# Patient Record
Sex: FEMALE | Race: WHITE | Employment: FULL TIME | URBAN - METROPOLITAN AREA
[De-identification: names, ages, dates, MRNs, and addresses within clinical notes are randomized per-mention and may not be internally consistent; named-entity substitution may affect disease eponyms.]

---

## 2017-05-03 ENCOUNTER — OFFICE VISIT (OUTPATIENT)
Dept: INTERNAL MEDICINE CLINIC | Facility: CLINIC | Age: 57
End: 2017-05-03

## 2017-05-03 VITALS
HEART RATE: 79 BPM | SYSTOLIC BLOOD PRESSURE: 134 MMHG | WEIGHT: 136.19 LBS | RESPIRATION RATE: 18 BRPM | DIASTOLIC BLOOD PRESSURE: 82 MMHG | HEIGHT: 67 IN | BODY MASS INDEX: 21.37 KG/M2 | OXYGEN SATURATION: 97 %

## 2017-05-03 DIAGNOSIS — E03.9 HYPOTHYROIDISM, UNSPECIFIED TYPE: ICD-10-CM

## 2017-05-03 DIAGNOSIS — Z12.11 SCREENING FOR COLON CANCER: ICD-10-CM

## 2017-05-03 DIAGNOSIS — J45.41 MODERATE PERSISTENT ASTHMA WITH ACUTE EXACERBATION: ICD-10-CM

## 2017-05-03 DIAGNOSIS — D72.829 LEUKOCYTOSIS, UNSPECIFIED TYPE: ICD-10-CM

## 2017-05-03 DIAGNOSIS — R42 DIZZINESS: Primary | ICD-10-CM

## 2017-05-03 DIAGNOSIS — E53.8 VITAMIN B12 DEFICIENCY: ICD-10-CM

## 2017-05-03 PROCEDURE — 87186 SC STD MICRODIL/AGAR DIL: CPT | Performed by: INTERNAL MEDICINE

## 2017-05-03 PROCEDURE — 87086 URINE CULTURE/COLONY COUNT: CPT | Performed by: INTERNAL MEDICINE

## 2017-05-03 PROCEDURE — 82607 VITAMIN B-12: CPT | Performed by: INTERNAL MEDICINE

## 2017-05-03 PROCEDURE — 99203 OFFICE O/P NEW LOW 30 MIN: CPT | Performed by: INTERNAL MEDICINE

## 2017-05-03 PROCEDURE — 82728 ASSAY OF FERRITIN: CPT | Performed by: INTERNAL MEDICINE

## 2017-05-03 PROCEDURE — 82746 ASSAY OF FOLIC ACID SERUM: CPT | Performed by: INTERNAL MEDICINE

## 2017-05-03 PROCEDURE — 82306 VITAMIN D 25 HYDROXY: CPT | Performed by: INTERNAL MEDICINE

## 2017-05-03 PROCEDURE — 87077 CULTURE AEROBIC IDENTIFY: CPT | Performed by: INTERNAL MEDICINE

## 2017-05-03 PROCEDURE — 85025 COMPLETE CBC W/AUTO DIFF WBC: CPT | Performed by: INTERNAL MEDICINE

## 2017-05-03 PROCEDURE — 86255 FLUORESCENT ANTIBODY SCREEN: CPT | Performed by: INTERNAL MEDICINE

## 2017-05-03 RX ORDER — CHLORAL HYDRATE 500 MG
1000 CAPSULE ORAL DAILY
COMMUNITY
End: 2018-09-25 | Stop reason: ALTCHOICE

## 2017-05-03 RX ORDER — LEVOTHYROXINE SODIUM 0.05 MG/1
50 TABLET ORAL
COMMUNITY
End: 2019-09-04 | Stop reason: ALTCHOICE

## 2017-05-03 RX ORDER — MONTELUKAST SODIUM 10 MG/1
10 TABLET ORAL NIGHTLY
Qty: 30 TABLET | Refills: 2 | Status: SHIPPED | OUTPATIENT
Start: 2017-05-03 | End: 2017-06-02

## 2017-05-03 RX ORDER — METHYLPREDNISOLONE 4 MG/1
TABLET ORAL
Qty: 1 KIT | Refills: 0 | Status: SHIPPED | OUTPATIENT
Start: 2017-05-03 | End: 2018-05-16 | Stop reason: ALTCHOICE

## 2017-05-03 RX ORDER — PREDNISONE 50 MG/1
TABLET ORAL
Refills: 3 | COMMUNITY
Start: 2017-03-02 | End: 2017-05-31 | Stop reason: ALTCHOICE

## 2017-05-03 RX ORDER — LEVOTHYROXINE SODIUM 0.2 MG/1
200 TABLET ORAL
COMMUNITY
End: 2018-05-23

## 2017-05-03 RX ORDER — LISINOPRIL 5 MG/1
5 TABLET ORAL
Refills: 3 | COMMUNITY
Start: 2017-04-22 | End: 2018-09-25

## 2017-05-03 RX ORDER — MECLIZINE HYDROCHLORIDE 25 MG/1
TABLET ORAL
Refills: 0 | COMMUNITY
Start: 2017-04-22 | End: 2018-09-25 | Stop reason: ALTCHOICE

## 2017-05-03 RX ORDER — AMOXICILLIN AND CLAVULANATE POTASSIUM 875; 125 MG/1; MG/1
1 TABLET, FILM COATED ORAL 2 TIMES DAILY
Qty: 20 TABLET | Refills: 0 | Status: SHIPPED | OUTPATIENT
Start: 2017-05-03 | End: 2017-05-13

## 2017-05-04 NOTE — PROGRESS NOTES
Jeevan Hamilton is a 60year old female.     Chief complaint: establish care, dizziness , vitamin b12 def     HPI:     60year old female with PMH as listed below here to establish care and follow up on chronic conditions   Dizziness  Started 2 weeks ago Oral Tablet Therapy Pack As directed. Disp: 1 kit Rfl: 0   Montelukast Sodium 10 MG Oral Tab Take 1 tablet (10 mg total) by mouth nightly.  Disp: 30 tablet Rfl: 2      Past Medical History   Diagnosis Date   • Hypothyroidism    • Asthma          Past Surgic Future  Number of Occurrences: 1  Standing Expiration Date: 2/6/6719  Folic Acid Serum [E]  Standing Status: Future  Number of Occurrences: 1  Standing Expiration Date: 5/3/2018  Vitamin B12 [E]  Standing Status: Future  Number of Occurrences: 1  Standing

## 2017-05-05 ENCOUNTER — TELEPHONE (OUTPATIENT)
Dept: INTERNAL MEDICINE CLINIC | Facility: CLINIC | Age: 57
End: 2017-05-05

## 2017-05-10 RX ORDER — ERGOCALCIFEROL 1.25 MG/1
50000 CAPSULE ORAL WEEKLY
Qty: 12 CAPSULE | Refills: 0 | Status: SHIPPED | OUTPATIENT
Start: 2017-05-10 | End: 2017-07-27

## 2017-05-31 ENCOUNTER — OFFICE VISIT (OUTPATIENT)
Dept: INTERNAL MEDICINE CLINIC | Facility: CLINIC | Age: 57
End: 2017-05-31

## 2017-05-31 VITALS
WEIGHT: 133 LBS | BODY MASS INDEX: 20.88 KG/M2 | HEART RATE: 103 BPM | DIASTOLIC BLOOD PRESSURE: 74 MMHG | OXYGEN SATURATION: 98 % | HEIGHT: 67 IN | SYSTOLIC BLOOD PRESSURE: 126 MMHG | RESPIRATION RATE: 17 BRPM

## 2017-05-31 DIAGNOSIS — E55.9 VITAMIN D DEFICIENCY: ICD-10-CM

## 2017-05-31 DIAGNOSIS — J45.20 MILD INTERMITTENT ASTHMA WITHOUT COMPLICATION: Primary | ICD-10-CM

## 2017-05-31 DIAGNOSIS — I10 ESSENTIAL HYPERTENSION: ICD-10-CM

## 2017-05-31 DIAGNOSIS — Z12.39 SCREENING FOR BREAST CANCER: ICD-10-CM

## 2017-05-31 PROCEDURE — 99214 OFFICE O/P EST MOD 30 MIN: CPT | Performed by: INTERNAL MEDICINE

## 2017-05-31 RX ORDER — BUDESONIDE AND FORMOTEROL FUMARATE DIHYDRATE 80; 4.5 UG/1; UG/1
2 AEROSOL RESPIRATORY (INHALATION) 2 TIMES DAILY
Qty: 1 INHALER | Refills: 3 | Status: SHIPPED | OUTPATIENT
Start: 2017-05-31 | End: 2017-10-30

## 2017-05-31 NOTE — PROGRESS NOTES
Marcin Arredondo is a 60year old female.     Chief complaint:  Follow up on asthma, HTN  HPI:     60year old female with PMH as listed below here for follow up on asthma and htn  Asthma feels much better   Best she felt x 3 years after starting the new med Packs/Day: 0.00  Years:           Quit date: 05/03/2014    Alcohol Use: Yes           0.0 oz/week       0 Standard drinks or equivalent per week       Family History   Problem Relation Age of Onset   • Cancer Father      lung CA   • Cancer Mo

## 2017-10-30 RX ORDER — BUDESONIDE AND FORMOTEROL FUMARATE DIHYDRATE 80; 4.5 UG/1; UG/1
2 AEROSOL RESPIRATORY (INHALATION) 2 TIMES DAILY
Qty: 1 INHALER | Refills: 3 | Status: SHIPPED | OUTPATIENT
Start: 2017-10-30 | End: 2017-12-29

## 2017-10-30 RX ORDER — BUDESONIDE AND FORMOTEROL FUMARATE DIHYDRATE 80; 4.5 UG/1; UG/1
2 AEROSOL RESPIRATORY (INHALATION) 2 TIMES DAILY
Qty: 10.2 INHALER | Refills: 3 | Status: SHIPPED | OUTPATIENT
Start: 2017-10-30 | End: 2018-08-31

## 2018-01-08 RX ORDER — MONTELUKAST SODIUM 10 MG/1
10 TABLET ORAL
Refills: 3 | COMMUNITY
Start: 2017-12-07 | End: 2018-01-08

## 2018-01-09 RX ORDER — MONTELUKAST SODIUM 10 MG/1
10 TABLET ORAL NIGHTLY
Qty: 90 TABLET | Refills: 0 | Status: SHIPPED | OUTPATIENT
Start: 2018-01-09 | End: 2018-03-19

## 2018-03-19 RX ORDER — MONTELUKAST SODIUM 10 MG/1
10 TABLET ORAL NIGHTLY
Qty: 90 TABLET | Refills: 0 | Status: SHIPPED | OUTPATIENT
Start: 2018-03-19 | End: 2018-07-20

## 2018-03-28 ENCOUNTER — OFFICE VISIT (OUTPATIENT)
Dept: INTERNAL MEDICINE CLINIC | Facility: CLINIC | Age: 58
End: 2018-03-28

## 2018-03-28 DIAGNOSIS — Z53.21 PATIENT LEFT WITHOUT BEING SEEN: Primary | ICD-10-CM

## 2018-05-16 ENCOUNTER — OFFICE VISIT (OUTPATIENT)
Dept: INTERNAL MEDICINE CLINIC | Facility: CLINIC | Age: 58
End: 2018-05-16

## 2018-05-16 VITALS
OXYGEN SATURATION: 100 % | DIASTOLIC BLOOD PRESSURE: 98 MMHG | HEIGHT: 67 IN | BODY MASS INDEX: 22.29 KG/M2 | SYSTOLIC BLOOD PRESSURE: 156 MMHG | HEART RATE: 65 BPM | WEIGHT: 142 LBS | TEMPERATURE: 98 F

## 2018-05-16 DIAGNOSIS — Z12.4 SCREENING FOR CERVICAL CANCER: ICD-10-CM

## 2018-05-16 DIAGNOSIS — J45.20 MILD INTERMITTENT ASTHMA WITHOUT COMPLICATION: ICD-10-CM

## 2018-05-16 DIAGNOSIS — Z00.00 ANNUAL PHYSICAL EXAM: Primary | ICD-10-CM

## 2018-05-16 DIAGNOSIS — Z12.11 SCREENING FOR COLON CANCER: ICD-10-CM

## 2018-05-16 DIAGNOSIS — I10 ESSENTIAL HYPERTENSION: ICD-10-CM

## 2018-05-16 PROCEDURE — 36415 COLL VENOUS BLD VENIPUNCTURE: CPT | Performed by: INTERNAL MEDICINE

## 2018-05-16 PROCEDURE — 84439 ASSAY OF FREE THYROXINE: CPT | Performed by: INTERNAL MEDICINE

## 2018-05-16 PROCEDURE — 80061 LIPID PANEL: CPT | Performed by: INTERNAL MEDICINE

## 2018-05-16 PROCEDURE — 84480 ASSAY TRIIODOTHYRONINE (T3): CPT | Performed by: INTERNAL MEDICINE

## 2018-05-16 PROCEDURE — 80050 GENERAL HEALTH PANEL: CPT | Performed by: INTERNAL MEDICINE

## 2018-05-16 PROCEDURE — 99396 PREV VISIT EST AGE 40-64: CPT | Performed by: INTERNAL MEDICINE

## 2018-05-16 RX ORDER — BUDESONIDE AND FORMOTEROL FUMARATE DIHYDRATE 80; 4.5 UG/1; UG/1
AEROSOL RESPIRATORY (INHALATION)
COMMUNITY
Start: 2018-03-17 | End: 2019-01-18

## 2018-05-16 RX ORDER — LISINOPRIL 5 MG/1
5 TABLET ORAL DAILY
Qty: 90 TABLET | Refills: 0 | Status: SHIPPED | OUTPATIENT
Start: 2018-05-16 | End: 2018-09-08

## 2018-05-16 RX ORDER — ALBUTEROL SULFATE 90 UG/1
2 AEROSOL, METERED RESPIRATORY (INHALATION)
COMMUNITY
Start: 2013-04-17 | End: 2018-05-23

## 2018-05-16 NOTE — PROGRESS NOTES
Kevin Leyva is a 59year old female.     Chief complaint: annual physical exam     HPI:     59year old female with PMH as listed below here for   Annual physical exam   Patient reports feeling well   No chest pain no sob no abdominal pain  No diarrhea Problem Relation Age of Onset   • Cancer Father      lung CA   • Cancer Mother    • Hypertension Sister      There is no problem list on file for this patient. REVIEW OF SYSTEMS:   A comprehensive 10 point review of systems was completed.   Pertinent INTERNAL  - Albuterol Sulfate  (90 Base) MCG/ACT Inhalation Aero Soln; Inhale 2 puffs into the lungs.  - SYMBICORT 80-4.5 MCG/ACT Inhalation Aerosol;   - CBC WITH DIFFERENTIAL WITH PLATELET; Future  - COMP METABOLIC PANEL (14);  Future  - LIPID PANEL

## 2018-05-21 ENCOUNTER — TELEPHONE (OUTPATIENT)
Dept: INTERNAL MEDICINE CLINIC | Facility: CLINIC | Age: 58
End: 2018-05-21

## 2018-05-21 DIAGNOSIS — R79.89 ABNORMAL THYROID BLOOD TEST: Primary | ICD-10-CM

## 2018-05-22 ENCOUNTER — TELEPHONE (OUTPATIENT)
Dept: INTERNAL MEDICINE CLINIC | Facility: CLINIC | Age: 58
End: 2018-05-22

## 2018-05-23 ENCOUNTER — TELEPHONE (OUTPATIENT)
Dept: INTERNAL MEDICINE CLINIC | Facility: CLINIC | Age: 58
End: 2018-05-23

## 2018-05-23 DIAGNOSIS — Z12.11 SCREENING FOR COLON CANCER: ICD-10-CM

## 2018-05-23 DIAGNOSIS — J45.41 MODERATE PERSISTENT ASTHMA WITH ACUTE EXACERBATION: ICD-10-CM

## 2018-05-23 DIAGNOSIS — D72.829 LEUKOCYTOSIS, UNSPECIFIED TYPE: ICD-10-CM

## 2018-05-23 DIAGNOSIS — R42 DIZZINESS: ICD-10-CM

## 2018-05-23 DIAGNOSIS — Z00.00 ANNUAL PHYSICAL EXAM: ICD-10-CM

## 2018-05-23 RX ORDER — LEVOTHYROXINE SODIUM 0.2 MG/1
200 TABLET ORAL
Qty: 90 TABLET | Refills: 1 | Status: SHIPPED | OUTPATIENT
Start: 2018-05-23 | End: 2019-06-12 | Stop reason: CLARIF

## 2018-05-23 RX ORDER — ALBUTEROL SULFATE 90 UG/1
2 AEROSOL, METERED RESPIRATORY (INHALATION) EVERY 6 HOURS PRN
Qty: 3 INHALER | Refills: 3 | Status: SHIPPED | OUTPATIENT
Start: 2018-05-23

## 2018-07-20 RX ORDER — MONTELUKAST SODIUM 10 MG/1
10 TABLET ORAL NIGHTLY
Qty: 90 TABLET | Refills: 0 | Status: SHIPPED
Start: 2018-07-20 | End: 2020-08-31

## 2018-07-20 NOTE — TELEPHONE ENCOUNTER
From: Gucci Orta  Sent: 7/20/2018 11:05 AM CDT  Subject: Medication Renewal Request    Gucci Orta would like a refill of the following medications:     Montelukast Sodium 10 MG Oral Tab Ananda Curran MD]    Preferred pharmacy: [id number]

## 2018-08-01 ENCOUNTER — TELEPHONE (OUTPATIENT)
Dept: INTERNAL MEDICINE CLINIC | Facility: CLINIC | Age: 58
End: 2018-08-01

## 2018-08-01 NOTE — TELEPHONE ENCOUNTER
would like to change medication symbicort, says she will have to pay $100 out of pocket. Needs to change to another medication that is covered by insurance.

## 2018-09-03 RX ORDER — BUDESONIDE AND FORMOTEROL FUMARATE DIHYDRATE 80; 4.5 UG/1; UG/1
2 AEROSOL RESPIRATORY (INHALATION) 2 TIMES DAILY
Qty: 10.2 INHALER | Refills: 3 | Status: SHIPPED | OUTPATIENT
Start: 2018-09-03 | End: 2018-09-25 | Stop reason: ALTCHOICE

## 2018-09-08 DIAGNOSIS — Z00.00 ANNUAL PHYSICAL EXAM: ICD-10-CM

## 2018-09-08 DIAGNOSIS — Z12.11 SCREENING FOR COLON CANCER: ICD-10-CM

## 2018-09-10 RX ORDER — LISINOPRIL 5 MG/1
TABLET ORAL
Qty: 90 TABLET | Refills: 0 | Status: SHIPPED | OUTPATIENT
Start: 2018-09-10 | End: 2018-12-07

## 2018-09-21 NOTE — TELEPHONE ENCOUNTER
Asthma & COPD Medication Protocol Failed9/21 1:54 AM   AAP/ACT given in last 12 months     Last OV 05/16/18.  No future appt

## 2018-09-22 RX ORDER — MONTELUKAST SODIUM 10 MG/1
TABLET ORAL
Qty: 90 TABLET | Refills: 0 | Status: SHIPPED | OUTPATIENT
Start: 2018-09-22 | End: 2018-09-25 | Stop reason: ALTCHOICE

## 2018-09-25 ENCOUNTER — OFFICE VISIT (OUTPATIENT)
Dept: INTERNAL MEDICINE CLINIC | Facility: CLINIC | Age: 58
End: 2018-09-25
Payer: COMMERCIAL

## 2018-09-25 VITALS
RESPIRATION RATE: 18 BRPM | DIASTOLIC BLOOD PRESSURE: 72 MMHG | TEMPERATURE: 99 F | WEIGHT: 139.38 LBS | SYSTOLIC BLOOD PRESSURE: 122 MMHG | BODY MASS INDEX: 21.88 KG/M2 | HEART RATE: 81 BPM | OXYGEN SATURATION: 98 % | HEIGHT: 67 IN

## 2018-09-25 DIAGNOSIS — J02.9 PHARYNGITIS, UNSPECIFIED ETIOLOGY: Primary | ICD-10-CM

## 2018-09-25 DIAGNOSIS — Z12.11 SCREENING FOR COLON CANCER: ICD-10-CM

## 2018-09-25 DIAGNOSIS — E03.9 HYPOTHYROIDISM, UNSPECIFIED TYPE: ICD-10-CM

## 2018-09-25 DIAGNOSIS — I10 ESSENTIAL HYPERTENSION: ICD-10-CM

## 2018-09-25 LAB
CONTROL LINE PRESENT WITH A CLEAR BACKGROUND (YES/NO): YES YES/NO
STREP GRP A CUL-SCR: NEGATIVE
TSH SERPL-ACNC: 4.31 UIU/ML (ref 0.45–5.33)

## 2018-09-25 PROCEDURE — 87081 CULTURE SCREEN ONLY: CPT | Performed by: INTERNAL MEDICINE

## 2018-09-25 PROCEDURE — 87880 STREP A ASSAY W/OPTIC: CPT | Performed by: INTERNAL MEDICINE

## 2018-09-25 PROCEDURE — 99214 OFFICE O/P EST MOD 30 MIN: CPT | Performed by: INTERNAL MEDICINE

## 2018-09-25 PROCEDURE — 84443 ASSAY THYROID STIM HORMONE: CPT | Performed by: INTERNAL MEDICINE

## 2018-09-25 RX ORDER — AMOXICILLIN AND CLAVULANATE POTASSIUM 875; 125 MG/1; MG/1
1 TABLET, FILM COATED ORAL 2 TIMES DAILY
Qty: 20 TABLET | Refills: 0 | Status: SHIPPED | OUTPATIENT
Start: 2018-09-25 | End: 2018-10-05

## 2018-09-25 NOTE — PROGRESS NOTES
Jeevan Hamilton is a 59year old female.     Chief complaint: sore throat    HPI:   59year old female with PMH as listed below here for   Sore throat  Started 1 week ago   Chills in the beginning   No fever  Little pain discomfort and sneezing   No chest p completed.   Pertinent positives and negatives noted in the the HPI            EXAM:   /72 (BP Location: Right arm, Patient Position: Sitting, Cuff Size: adult)   Pulse 81   Temp 98.9 °F (37.2 °C) (Oral)   Resp (!) 98   Ht 67\"   Wt 139 lb 6.4 oz   BM

## 2018-12-07 DIAGNOSIS — Z12.11 SCREENING FOR COLON CANCER: ICD-10-CM

## 2018-12-07 DIAGNOSIS — Z00.00 ANNUAL PHYSICAL EXAM: ICD-10-CM

## 2018-12-07 RX ORDER — LISINOPRIL 5 MG/1
TABLET ORAL
Qty: 90 TABLET | Refills: 0 | Status: SHIPPED | OUTPATIENT
Start: 2018-12-07 | End: 2019-03-08

## 2019-01-21 ENCOUNTER — TELEPHONE (OUTPATIENT)
Dept: INTERNAL MEDICINE CLINIC | Facility: CLINIC | Age: 59
End: 2019-01-21

## 2019-01-21 NOTE — TELEPHONE ENCOUNTER
My chart message sent. Called home & mobile numbers. LVM to inform Pt I faxed medication to [facility].

## 2019-02-05 DIAGNOSIS — E03.9 HYPOTHYROIDISM, UNSPECIFIED TYPE: Primary | ICD-10-CM

## 2019-02-06 ENCOUNTER — NURSE ONLY (OUTPATIENT)
Dept: INTERNAL MEDICINE CLINIC | Facility: CLINIC | Age: 59
End: 2019-02-06
Payer: COMMERCIAL

## 2019-02-06 DIAGNOSIS — E03.9 HYPOTHYROIDISM, UNSPECIFIED TYPE: ICD-10-CM

## 2019-02-06 LAB
T4 FREE SERPL-MCNC: 1.82 NG/DL (ref 0.58–1.64)
TSH SERPL-ACNC: 0.32 UIU/ML (ref 0.45–5.33)

## 2019-02-06 PROCEDURE — 84439 ASSAY OF FREE THYROXINE: CPT | Performed by: INTERNAL MEDICINE

## 2019-02-06 PROCEDURE — 84443 ASSAY THYROID STIM HORMONE: CPT | Performed by: INTERNAL MEDICINE

## 2019-02-06 PROCEDURE — 36415 COLL VENOUS BLD VENIPUNCTURE: CPT | Performed by: INTERNAL MEDICINE

## 2019-02-13 RX ORDER — LEVOTHYROXINE SODIUM 175 UG/1
175 TABLET ORAL
Qty: 90 TABLET | Refills: 1 | Status: SHIPPED | OUTPATIENT
Start: 2019-02-13 | End: 2019-09-04

## 2019-03-08 DIAGNOSIS — Z12.11 SCREENING FOR COLON CANCER: ICD-10-CM

## 2019-03-08 DIAGNOSIS — Z00.00 ANNUAL PHYSICAL EXAM: ICD-10-CM

## 2019-03-09 RX ORDER — LISINOPRIL 5 MG/1
TABLET ORAL
Qty: 90 TABLET | Refills: 0 | Status: SHIPPED | OUTPATIENT
Start: 2019-03-09 | End: 2019-06-20

## 2019-05-16 RX ORDER — MONTELUKAST SODIUM 10 MG/1
TABLET ORAL
Qty: 90 TABLET | Refills: 0 | OUTPATIENT
Start: 2019-05-16

## 2019-05-30 RX ORDER — MONTELUKAST SODIUM 10 MG/1
TABLET ORAL
Qty: 90 TABLET | Refills: 0 | Status: SHIPPED | OUTPATIENT
Start: 2019-05-30 | End: 2019-09-04

## 2019-05-30 NOTE — TELEPHONE ENCOUNTER
Asthma & COPD Medication Protocol Failed5/30 11:58 AM   Asthma Action Score greater than or equal to 20    AAP/ACT given in last 12 months    Appointment in past 6 or next 3 months      Last OV 9/25/18  Future appt 6/12/19

## 2019-06-06 DIAGNOSIS — Z12.11 SCREENING FOR COLON CANCER: ICD-10-CM

## 2019-06-06 DIAGNOSIS — Z00.00 ANNUAL PHYSICAL EXAM: ICD-10-CM

## 2019-06-07 RX ORDER — LISINOPRIL 5 MG/1
TABLET ORAL
Qty: 90 TABLET | Refills: 0 | OUTPATIENT
Start: 2019-06-07

## 2019-06-07 NOTE — TELEPHONE ENCOUNTER
Last office visit: 9/25/2018 acute-sore throat  Last refill: 3/9/2019 qty:90   Pt has upcoming appt 6/12/19

## 2019-06-12 ENCOUNTER — OFFICE VISIT (OUTPATIENT)
Dept: INTERNAL MEDICINE CLINIC | Facility: CLINIC | Age: 59
End: 2019-06-12
Payer: COMMERCIAL

## 2019-06-12 VITALS
SYSTOLIC BLOOD PRESSURE: 140 MMHG | WEIGHT: 140.19 LBS | OXYGEN SATURATION: 98 % | TEMPERATURE: 98 F | BODY MASS INDEX: 22 KG/M2 | HEART RATE: 80 BPM | HEIGHT: 67 IN | RESPIRATION RATE: 18 BRPM | DIASTOLIC BLOOD PRESSURE: 70 MMHG

## 2019-06-12 DIAGNOSIS — E16.2 HYPOGLYCEMIA: ICD-10-CM

## 2019-06-12 DIAGNOSIS — E03.9 HYPOTHYROIDISM, UNSPECIFIED TYPE: ICD-10-CM

## 2019-06-12 DIAGNOSIS — H93.8X1 PRESSURE SENSATION IN RIGHT EAR: ICD-10-CM

## 2019-06-12 DIAGNOSIS — I10 ESSENTIAL HYPERTENSION: Primary | ICD-10-CM

## 2019-06-12 DIAGNOSIS — Z12.11 SCREENING FOR COLON CANCER: ICD-10-CM

## 2019-06-12 DIAGNOSIS — J30.9 ALLERGIC SINUSITIS: ICD-10-CM

## 2019-06-12 DIAGNOSIS — Z12.39 SCREENING FOR BREAST CANCER: ICD-10-CM

## 2019-06-12 PROCEDURE — 84439 ASSAY OF FREE THYROXINE: CPT | Performed by: INTERNAL MEDICINE

## 2019-06-12 PROCEDURE — 80053 COMPREHEN METABOLIC PANEL: CPT | Performed by: INTERNAL MEDICINE

## 2019-06-12 PROCEDURE — 82306 VITAMIN D 25 HYDROXY: CPT | Performed by: INTERNAL MEDICINE

## 2019-06-12 PROCEDURE — 99214 OFFICE O/P EST MOD 30 MIN: CPT | Performed by: INTERNAL MEDICINE

## 2019-06-12 PROCEDURE — 82274 ASSAY TEST FOR BLOOD FECAL: CPT | Performed by: INTERNAL MEDICINE

## 2019-06-12 PROCEDURE — 84443 ASSAY THYROID STIM HORMONE: CPT | Performed by: INTERNAL MEDICINE

## 2019-06-12 RX ORDER — LEVOCETIRIZINE DIHYDROCHLORIDE 5 MG/1
5 TABLET, FILM COATED ORAL EVERY EVENING
Qty: 30 TABLET | Refills: 2 | Status: SHIPPED | OUTPATIENT
Start: 2019-06-12 | End: 2019-07-12

## 2019-06-12 RX ORDER — BUDESONIDE AND FORMOTEROL FUMARATE DIHYDRATE 80; 4.5 UG/1; UG/1
2 AEROSOL RESPIRATORY (INHALATION) 2 TIMES DAILY
Qty: 4 INHALER | Refills: 1 | Status: SHIPPED | OUTPATIENT
Start: 2019-06-12 | End: 2019-10-10

## 2019-06-12 RX ORDER — FLUTICASONE PROPIONATE 50 MCG
2 SPRAY, SUSPENSION (ML) NASAL DAILY
Qty: 1 BOTTLE | Refills: 3 | Status: SHIPPED | OUTPATIENT
Start: 2019-06-12 | End: 2020-06-06

## 2019-06-12 RX ORDER — LISINOPRIL 10 MG/1
10 TABLET ORAL DAILY
Qty: 90 TABLET | Refills: 1 | Status: SHIPPED | OUTPATIENT
Start: 2019-06-12 | End: 2019-09-04

## 2019-06-12 NOTE — PROGRESS NOTES
Génesis Branham is a 59year old female.     Chief complaint:here for follow up on chronic conditions     HPI:   Reports Congestion in right ear   little lightheaded today   Has been going on for Last couple of weeks   Seasonal allergy  No fever or chills Hypertension Sister      Patient Active Problem List:     Mild intermittent asthma without complication     Essential hypertension      REVIEW OF SYSTEMS:   A comprehensive 10 point review of systems was completed.   Pertinent positives and negatives noted Essential hypertension  Increase lisinopril to 10 mg daily   cmp     6.  Hypothyroidism, unspecified type  tsh   Continue levothyroxine   Recheck TSH   Please return to the clinic if you are having persistent or worsening symptoms   Kira Vitale MD,   Dip

## 2019-06-17 ENCOUNTER — TELEPHONE (OUTPATIENT)
Dept: INTERNAL MEDICINE CLINIC | Facility: CLINIC | Age: 59
End: 2019-06-17

## 2019-06-17 NOTE — TELEPHONE ENCOUNTER
Pt called and states that she saw Dr Hector Sommer last Wednesday and was wondering if someone could give her a call back concerning her prescriptions.  Please advise

## 2019-06-19 ENCOUNTER — TELEPHONE (OUTPATIENT)
Dept: INTERNAL MEDICINE CLINIC | Facility: CLINIC | Age: 59
End: 2019-06-19

## 2019-06-19 RX ORDER — LEVOTHYROXINE SODIUM 175 UG/1
175 TABLET ORAL
Qty: 105 TABLET | Refills: 1 | Status: SHIPPED | OUTPATIENT
Start: 2019-06-19 | End: 2019-09-17

## 2019-06-19 NOTE — TELEPHONE ENCOUNTER
Pt called and states that Dr Baltazar Robins called her but she wasn't sure why, possibly to discuss test results? Please advise.

## 2019-06-20 DIAGNOSIS — Z00.00 ANNUAL PHYSICAL EXAM: ICD-10-CM

## 2019-06-20 DIAGNOSIS — Z12.11 SCREENING FOR COLON CANCER: ICD-10-CM

## 2019-06-20 RX ORDER — LISINOPRIL 5 MG/1
TABLET ORAL
Qty: 90 TABLET | Refills: 0 | OUTPATIENT
Start: 2019-06-20

## 2019-06-20 RX ORDER — LISINOPRIL 5 MG/1
5 TABLET ORAL
Qty: 90 TABLET | Refills: 0 | Status: SHIPPED | OUTPATIENT
Start: 2019-06-20 | End: 2019-09-15

## 2019-09-04 ENCOUNTER — OFFICE VISIT (OUTPATIENT)
Dept: INTERNAL MEDICINE CLINIC | Facility: CLINIC | Age: 59
End: 2019-09-04
Payer: COMMERCIAL

## 2019-09-04 VITALS
DIASTOLIC BLOOD PRESSURE: 72 MMHG | HEART RATE: 85 BPM | WEIGHT: 144 LBS | OXYGEN SATURATION: 98 % | RESPIRATION RATE: 18 BRPM | TEMPERATURE: 98 F | SYSTOLIC BLOOD PRESSURE: 120 MMHG | HEIGHT: 67 IN | BODY MASS INDEX: 22.6 KG/M2

## 2019-09-04 DIAGNOSIS — Z00.00 ANNUAL PHYSICAL EXAM: Primary | ICD-10-CM

## 2019-09-04 DIAGNOSIS — J45.41 MODERATE PERSISTENT ASTHMA WITH ACUTE EXACERBATION: ICD-10-CM

## 2019-09-04 DIAGNOSIS — Z87.891 SMOKING HISTORY: ICD-10-CM

## 2019-09-04 DIAGNOSIS — E03.9 HYPOTHYROIDISM, UNSPECIFIED TYPE: ICD-10-CM

## 2019-09-04 DIAGNOSIS — R49.0 HOARSENESS OF VOICE: ICD-10-CM

## 2019-09-04 DIAGNOSIS — R42 DIZZINESS: ICD-10-CM

## 2019-09-04 DIAGNOSIS — I10 ESSENTIAL HYPERTENSION: ICD-10-CM

## 2019-09-04 PROCEDURE — 99396 PREV VISIT EST AGE 40-64: CPT | Performed by: INTERNAL MEDICINE

## 2019-09-04 RX ORDER — MECLIZINE HYDROCHLORIDE 25 MG/1
25 TABLET ORAL 3 TIMES DAILY PRN
Qty: 30 TABLET | Refills: 0 | Status: SHIPPED | OUTPATIENT
Start: 2019-09-04 | End: 2020-06-24 | Stop reason: ALTCHOICE

## 2019-09-04 NOTE — PROGRESS NOTES
Aldo Selby is a 62year old female.     Chief complaint:  Annual physical exam       HPI:   Here for annual physical exam   Has been having some voice changes   Clearing her throat   Postnasal drip   + coughing at times   Smoked for 40 years   Smoked 1 standard drinks      Comment: occ    Drug use: No       Family History   Problem Relation Age of Onset   • Cancer Father         lung CA   • Cancer Mother    • Hypertension Sister      Patient Active Problem List:     Mild intermittent asthma without compl level       5. Essential hypertension  Continue lisinopril   Will check cbc , cmp     6. Moderate persistent asthma with acute exacerbation  Continue symbicort   7.  Hoarseness of voice  Denied refluex   Discussed symbicort can cause hoarseness   Given her

## 2019-09-10 ENCOUNTER — TELEPHONE (OUTPATIENT)
Dept: INTERNAL MEDICINE CLINIC | Facility: CLINIC | Age: 59
End: 2019-09-10

## 2019-09-13 NOTE — TELEPHONE ENCOUNTER
Stress Echo does not require prior auth. Thus has been approved by insurance. MRI Brain and CT Lung still pending.

## 2019-09-15 DIAGNOSIS — Z00.00 ANNUAL PHYSICAL EXAM: ICD-10-CM

## 2019-09-15 DIAGNOSIS — Z12.11 SCREENING FOR COLON CANCER: ICD-10-CM

## 2019-09-16 ENCOUNTER — TELEPHONE (OUTPATIENT)
Dept: INTERNAL MEDICINE CLINIC | Facility: CLINIC | Age: 59
End: 2019-09-16

## 2019-09-16 RX ORDER — LISINOPRIL 5 MG/1
TABLET ORAL
Qty: 90 TABLET | Refills: 0 | Status: SHIPPED | OUTPATIENT
Start: 2019-09-16 | End: 2019-12-10

## 2019-09-16 NOTE — TELEPHONE ENCOUNTER
Patient is scheduled for blood work that she said Dr. Hector Sommer ordered. Coming for a nurse visit on Wed, 9/18 at 10:30 a.m. Verifying that the blood work is ordered.

## 2019-09-18 ENCOUNTER — NURSE ONLY (OUTPATIENT)
Dept: INTERNAL MEDICINE CLINIC | Facility: CLINIC | Age: 59
End: 2019-09-18
Payer: COMMERCIAL

## 2019-09-18 DIAGNOSIS — Z00.00 ANNUAL PHYSICAL EXAM: ICD-10-CM

## 2019-09-18 DIAGNOSIS — R49.0 HOARSENESS OF VOICE: ICD-10-CM

## 2019-09-18 DIAGNOSIS — E03.9 HYPOTHYROIDISM, UNSPECIFIED TYPE: ICD-10-CM

## 2019-09-18 DIAGNOSIS — Z87.891 SMOKING HISTORY: ICD-10-CM

## 2019-09-18 DIAGNOSIS — J45.41 MODERATE PERSISTENT ASTHMA WITH ACUTE EXACERBATION: ICD-10-CM

## 2019-09-18 DIAGNOSIS — I10 ESSENTIAL HYPERTENSION: ICD-10-CM

## 2019-09-18 DIAGNOSIS — R42 DIZZINESS: ICD-10-CM

## 2019-09-18 LAB
ALBUMIN SERPL-MCNC: 3.8 G/DL (ref 3.4–5)
ALBUMIN/GLOB SERPL: 1.1 {RATIO} (ref 1–2)
ALP LIVER SERPL-CCNC: 65 U/L (ref 50–130)
ALT SERPL-CCNC: 20 U/L (ref 13–56)
ANION GAP SERPL CALC-SCNC: 6 MMOL/L (ref 0–18)
AST SERPL-CCNC: 15 U/L (ref 15–37)
BASOPHILS # BLD AUTO: 0.05 X10(3) UL (ref 0–0.2)
BASOPHILS NFR BLD AUTO: 0.7 %
BILIRUB SERPL-MCNC: 0.5 MG/DL (ref 0.1–2)
BUN BLD-MCNC: 17 MG/DL (ref 7–18)
BUN/CREAT SERPL: 22.4 (ref 10–20)
CALCIUM BLD-MCNC: 9.4 MG/DL (ref 8.5–10.1)
CHLORIDE SERPL-SCNC: 106 MMOL/L (ref 98–112)
CHOLEST SMN-MCNC: 186 MG/DL (ref ?–200)
CO2 SERPL-SCNC: 26 MMOL/L (ref 21–32)
CREAT BLD-MCNC: 0.76 MG/DL (ref 0.55–1.02)
DEPRECATED RDW RBC AUTO: 41.5 FL (ref 35.1–46.3)
EOSINOPHIL # BLD AUTO: 0.36 X10(3) UL (ref 0–0.7)
EOSINOPHIL NFR BLD AUTO: 4.9 %
ERYTHROCYTE [DISTWIDTH] IN BLOOD BY AUTOMATED COUNT: 11.9 % (ref 11–15)
GLOBULIN PLAS-MCNC: 3.5 G/DL (ref 2.8–4.4)
GLUCOSE BLD-MCNC: 79 MG/DL (ref 70–99)
HCT VFR BLD AUTO: 40.1 % (ref 35–48)
HDLC SERPL-MCNC: 64 MG/DL (ref 40–59)
HGB BLD-MCNC: 12.6 G/DL (ref 12–16)
IMM GRANULOCYTES # BLD AUTO: 0.01 X10(3) UL (ref 0–1)
IMM GRANULOCYTES NFR BLD: 0.1 %
LDLC SERPL CALC-MCNC: 106 MG/DL (ref ?–100)
LYMPHOCYTES # BLD AUTO: 2.11 X10(3) UL (ref 1–4)
LYMPHOCYTES NFR BLD AUTO: 28.6 %
M PROTEIN MFR SERPL ELPH: 7.3 G/DL (ref 6.4–8.2)
MCH RBC QN AUTO: 30 PG (ref 26–34)
MCHC RBC AUTO-ENTMCNC: 31.4 G/DL (ref 31–37)
MCV RBC AUTO: 95.5 FL (ref 80–100)
MONOCYTES # BLD AUTO: 0.64 X10(3) UL (ref 0.1–1)
MONOCYTES NFR BLD AUTO: 8.7 %
NEUTROPHILS # BLD AUTO: 4.22 X10 (3) UL (ref 1.5–7.7)
NEUTROPHILS # BLD AUTO: 4.22 X10(3) UL (ref 1.5–7.7)
NEUTROPHILS NFR BLD AUTO: 57 %
NONHDLC SERPL-MCNC: 122 MG/DL (ref ?–130)
OSMOLALITY SERPL CALC.SUM OF ELEC: 286 MOSM/KG (ref 275–295)
PATIENT FASTING: YES
PATIENT FASTING: YES
PLATELET # BLD AUTO: 360 10(3)UL (ref 150–450)
POTASSIUM SERPL-SCNC: 4.3 MMOL/L (ref 3.5–5.1)
RBC # BLD AUTO: 4.2 X10(6)UL (ref 3.8–5.3)
SODIUM SERPL-SCNC: 138 MMOL/L (ref 136–145)
T4 FREE SERPL-MCNC: 1.2 NG/DL (ref 0.8–1.7)
TRIGL SERPL-MCNC: 78 MG/DL (ref 30–149)
TSI SER-ACNC: 3.88 MIU/ML (ref 0.36–3.74)
VIT B12 SERPL-MCNC: 283 PG/ML (ref 193–986)
VLDLC SERPL CALC-MCNC: 16 MG/DL (ref 0–30)
WBC # BLD AUTO: 7.4 X10(3) UL (ref 4–11)

## 2019-09-18 PROCEDURE — 80050 GENERAL HEALTH PANEL: CPT | Performed by: INTERNAL MEDICINE

## 2019-09-18 PROCEDURE — 80061 LIPID PANEL: CPT | Performed by: INTERNAL MEDICINE

## 2019-09-18 PROCEDURE — 36415 COLL VENOUS BLD VENIPUNCTURE: CPT | Performed by: INTERNAL MEDICINE

## 2019-09-18 PROCEDURE — 84439 ASSAY OF FREE THYROXINE: CPT | Performed by: INTERNAL MEDICINE

## 2019-09-18 PROCEDURE — 82607 VITAMIN B-12: CPT | Performed by: INTERNAL MEDICINE

## 2019-09-23 RX ORDER — LEVOTHYROXINE SODIUM 175 UG/1
175 TABLET ORAL
Qty: 105 TABLET | Refills: 0 | Status: SHIPPED | OUTPATIENT
Start: 2019-09-23 | End: 2020-06-24 | Stop reason: ALTCHOICE

## 2019-09-24 ENCOUNTER — TELEPHONE (OUTPATIENT)
Dept: INTERNAL MEDICINE CLINIC | Facility: CLINIC | Age: 59
End: 2019-09-24

## 2019-10-03 ENCOUNTER — TELEPHONE (OUTPATIENT)
Dept: INTERNAL MEDICINE CLINIC | Facility: CLINIC | Age: 59
End: 2019-10-03

## 2019-10-03 NOTE — TELEPHONE ENCOUNTER
Patient called the office regarding getting her current test results on an MRI and Echo Cardiogram that was completed at [facility].  Please call back at [phone]

## 2019-10-07 ENCOUNTER — TELEPHONE (OUTPATIENT)
Dept: INTERNAL MEDICINE CLINIC | Facility: CLINIC | Age: 59
End: 2019-10-07

## 2019-10-07 NOTE — TELEPHONE ENCOUNTER
Discussed with the patient   -Echo is normal except for mild mitral regurgitation : advised controlling risk factors BP, avoid smoking   And repeat echo in 1 year   -MRI brain no acute infarct , chronic mild microvascular changes   Avoid smoking , control

## 2019-11-14 ENCOUNTER — TELEPHONE (OUTPATIENT)
Dept: INTERNAL MEDICINE CLINIC | Facility: CLINIC | Age: 59
End: 2019-11-14

## 2019-11-14 NOTE — TELEPHONE ENCOUNTER
LVM:  Received approval from insurance for CHEST CT. Needs to be conducted no later than Dec 11, 2019.
22

## 2019-12-10 DIAGNOSIS — Z00.00 ANNUAL PHYSICAL EXAM: ICD-10-CM

## 2019-12-10 DIAGNOSIS — Z12.11 SCREENING FOR COLON CANCER: ICD-10-CM

## 2019-12-10 RX ORDER — LISINOPRIL 5 MG/1
TABLET ORAL
Qty: 90 TABLET | Refills: 0 | Status: SHIPPED | OUTPATIENT
Start: 2019-12-10 | End: 2021-01-05

## 2019-12-10 RX ORDER — MONTELUKAST SODIUM 10 MG/1
TABLET ORAL
Qty: 90 TABLET | Refills: 0 | Status: SHIPPED | OUTPATIENT
Start: 2019-12-10 | End: 2020-05-29

## 2019-12-10 NOTE — TELEPHONE ENCOUNTER
Asthma & COPD Medication Protocol [place]15/10 12:34 PM   Asthma Action Score greater than or equal to 20    AAP/ACT given in last 12 months    Appointment in past 6 or next 3 months      Last OV 9/4/19  No Future appt

## 2019-12-14 ENCOUNTER — TELEPHONE (OUTPATIENT)
Dept: INTERNAL MEDICINE CLINIC | Facility: CLINIC | Age: 59
End: 2019-12-14

## 2019-12-14 DIAGNOSIS — I25.84 CORONARY ARTERY CALCIFICATION: Primary | ICD-10-CM

## 2019-12-14 DIAGNOSIS — R91.8 MULTIPLE LUNG NODULES ON CT: ICD-10-CM

## 2019-12-14 DIAGNOSIS — K44.9 HIATAL HERNIA: ICD-10-CM

## 2019-12-14 DIAGNOSIS — I25.10 CORONARY ARTERY CALCIFICATION: Primary | ICD-10-CM

## 2019-12-14 NOTE — TELEPHONE ENCOUNTER
Discussed ct lung screen with the patient   + emphysema   Lung nodules 3-4 mm   Ground glass opacity   djd   Moderate HH and coronary artery calcifications     Advised to start baby asa CT calcium score   Repeat CT lung in 6-12 months   Gastro referral sin

## 2019-12-16 ENCOUNTER — TELEPHONE (OUTPATIENT)
Dept: INTERNAL MEDICINE CLINIC | Facility: CLINIC | Age: 59
End: 2019-12-16

## 2019-12-17 ENCOUNTER — PATIENT MESSAGE (OUTPATIENT)
Dept: INTERNAL MEDICINE CLINIC | Facility: CLINIC | Age: 59
End: 2019-12-17

## 2019-12-17 ENCOUNTER — TELEPHONE (OUTPATIENT)
Dept: INTERNAL MEDICINE CLINIC | Facility: CLINIC | Age: 59
End: 2019-12-17

## 2019-12-17 NOTE — TELEPHONE ENCOUNTER
Patient's  called needing a 5 digit number, for his insurance company, so the test can be pre-authorized for the CT Calcium Scoring Test.

## 2019-12-19 ENCOUNTER — TELEPHONE (OUTPATIENT)
Dept: INTERNAL MEDICINE CLINIC | Facility: CLINIC | Age: 59
End: 2019-12-19

## 2019-12-26 ENCOUNTER — TELEPHONE (OUTPATIENT)
Dept: INTERNAL MEDICINE CLINIC | Facility: CLINIC | Age: 59
End: 2019-12-26

## 2019-12-26 NOTE — TELEPHONE ENCOUNTER
Lisa from [employer] left message on nurse line stating CT Calcium Score testing is not clinically indicated so is being denied at this time.

## 2019-12-26 NOTE — TELEPHONE ENCOUNTER
Lisa from [employer] left message on nurse line stating CT Calcium Score testing is not clinically indicated so is being denied at this time.

## 2020-01-08 ENCOUNTER — NURSE ONLY (OUTPATIENT)
Dept: INTERNAL MEDICINE CLINIC | Facility: CLINIC | Age: 60
End: 2020-01-08
Payer: COMMERCIAL

## 2020-01-08 DIAGNOSIS — E03.9 HYPOTHYROIDISM, UNSPECIFIED TYPE: Primary | ICD-10-CM

## 2020-01-08 LAB
T4 FREE SERPL-MCNC: 1 NG/DL (ref 0.8–1.7)
TSI SER-ACNC: 23 MIU/ML (ref 0.36–3.74)

## 2020-01-08 PROCEDURE — 84439 ASSAY OF FREE THYROXINE: CPT | Performed by: INTERNAL MEDICINE

## 2020-01-08 PROCEDURE — 36415 COLL VENOUS BLD VENIPUNCTURE: CPT | Performed by: INTERNAL MEDICINE

## 2020-01-08 PROCEDURE — 84443 ASSAY THYROID STIM HORMONE: CPT | Performed by: INTERNAL MEDICINE

## 2020-01-11 ENCOUNTER — TELEPHONE (OUTPATIENT)
Dept: INTERNAL MEDICINE CLINIC | Facility: CLINIC | Age: 60
End: 2020-01-11

## 2020-01-11 NOTE — TELEPHONE ENCOUNTER
Medical records release form was received from  [facility] Ph. [phone]  Ph. [phone] Consumer Service Division and sent to [address] [place] [address] STAT

## 2020-05-26 DIAGNOSIS — Z00.00 ANNUAL PHYSICAL EXAM: ICD-10-CM

## 2020-05-26 DIAGNOSIS — Z12.11 SCREENING FOR COLON CANCER: ICD-10-CM

## 2020-05-26 RX ORDER — LISINOPRIL 5 MG/1
TABLET ORAL
Qty: 90 TABLET | Refills: 0 | OUTPATIENT
Start: 2020-05-26

## 2020-05-26 RX ORDER — MONTELUKAST SODIUM 10 MG/1
TABLET ORAL
Qty: 90 TABLET | Refills: 0 | OUTPATIENT
Start: 2020-05-26

## 2020-05-28 ENCOUNTER — PATIENT MESSAGE (OUTPATIENT)
Dept: INTERNAL MEDICINE CLINIC | Facility: CLINIC | Age: 60
End: 2020-05-28

## 2020-05-29 RX ORDER — MONTELUKAST SODIUM 10 MG/1
10 TABLET ORAL EVERY EVENING
Qty: 90 TABLET | Refills: 0 | Status: SHIPPED | OUTPATIENT
Start: 2020-05-29 | End: 2020-06-24

## 2020-05-29 NOTE — TELEPHONE ENCOUNTER
Amada Loredo, [place] 5/29/2020 7:44 AM CDT      ----- Message -----  From: Josué Lim  Sent: 5/28/2020 7:04 PM CDT  To: Meghan Bustamante Clinical Staff  Subject: Prescription Question     Hi . Hope all is well.   [facility] pharmacy reached out to me to tell m

## 2020-06-11 ENCOUNTER — TELEPHONE (OUTPATIENT)
Dept: INTERNAL MEDICINE CLINIC | Facility: CLINIC | Age: 60
End: 2020-06-11

## 2020-06-11 NOTE — TELEPHONE ENCOUNTER
Received fax from Pharmacy     Refill request for Synthroid (levothyroxine sodium) 200 MCG  Quantity: 180.00 Tablets  Refills: 1     CALL IN OR FAX IN MAILBOX. Unable to send e-scribe.

## 2020-06-15 RX ORDER — LEVOTHYROXINE SODIUM 0.2 MG/1
200 TABLET ORAL
Qty: 180 TABLET | Refills: 1 | Status: SHIPPED | OUTPATIENT
Start: 2020-06-15 | End: 2021-03-18

## 2020-06-24 ENCOUNTER — OFFICE VISIT (OUTPATIENT)
Dept: INTERNAL MEDICINE CLINIC | Facility: CLINIC | Age: 60
End: 2020-06-24
Payer: COMMERCIAL

## 2020-06-24 VITALS
BODY MASS INDEX: 22.13 KG/M2 | HEIGHT: 67 IN | RESPIRATION RATE: 18 BRPM | SYSTOLIC BLOOD PRESSURE: 124 MMHG | HEART RATE: 65 BPM | DIASTOLIC BLOOD PRESSURE: 80 MMHG | WEIGHT: 141 LBS | OXYGEN SATURATION: 99 %

## 2020-06-24 DIAGNOSIS — E03.9 HYPOTHYROIDISM, UNSPECIFIED TYPE: Primary | ICD-10-CM

## 2020-06-24 DIAGNOSIS — Z12.11 SCREENING FOR COLON CANCER: ICD-10-CM

## 2020-06-24 DIAGNOSIS — Z12.39 SCREENING FOR BREAST CANCER: ICD-10-CM

## 2020-06-24 DIAGNOSIS — I10 ESSENTIAL HYPERTENSION: ICD-10-CM

## 2020-06-24 DIAGNOSIS — R91.1 LUNG NODULE: ICD-10-CM

## 2020-06-24 DIAGNOSIS — J45.20 MILD INTERMITTENT ASTHMA WITHOUT COMPLICATION: ICD-10-CM

## 2020-06-24 PROCEDURE — 84439 ASSAY OF FREE THYROXINE: CPT | Performed by: INTERNAL MEDICINE

## 2020-06-24 PROCEDURE — 84443 ASSAY THYROID STIM HORMONE: CPT | Performed by: INTERNAL MEDICINE

## 2020-06-24 PROCEDURE — 80053 COMPREHEN METABOLIC PANEL: CPT | Performed by: INTERNAL MEDICINE

## 2020-06-24 PROCEDURE — 36415 COLL VENOUS BLD VENIPUNCTURE: CPT | Performed by: INTERNAL MEDICINE

## 2020-06-24 PROCEDURE — 99214 OFFICE O/P EST MOD 30 MIN: CPT | Performed by: INTERNAL MEDICINE

## 2020-06-24 RX ORDER — BUDESONIDE AND FORMOTEROL FUMARATE DIHYDRATE 80; 4.5 UG/1; UG/1
2 AEROSOL RESPIRATORY (INHALATION) 2 TIMES DAILY
Qty: 3 INHALER | Refills: 3 | Status: SHIPPED | OUTPATIENT
Start: 2020-06-24 | End: 2020-07-15

## 2020-06-24 NOTE — PROGRESS NOTES
Génesis Branham is a 62year old female.     Chief complaint: follow up on chronic conditions     HPI:   62year old female with PMH as listed below  Here for   Follow up on chronic conditions     Asthma   Has some sinus congestion   Got worse in the last w Hypertension Sister      Patient Active Problem List:     Mild intermittent asthma without complication     Essential hypertension      REVIEW OF SYSTEMS:   A comprehensive 10 point review of systems was completed.   Pertinent positives and negatives noted return to the clinic if you are having persistent or worsening symptoms   Fawad Quintero MD,   Diplomate of the American Board of Internal Medicine  Diplomate of the American Board of Obesity Medicine

## 2020-06-24 NOTE — PROGRESS NOTES
Pt presented to clinic today for blood draw. Per physician able to draw orders. Orders  documented within chart. Pt tolerated lab draw well.  verified.   Orders drawn include: tsh, cmp  Site of draw: rt haydee Tomlinson CMA

## 2020-06-29 ENCOUNTER — PATIENT MESSAGE (OUTPATIENT)
Dept: INTERNAL MEDICINE CLINIC | Facility: CLINIC | Age: 60
End: 2020-06-29

## 2020-06-29 DIAGNOSIS — E03.9 HYPOTHYROIDISM, UNSPECIFIED TYPE: Primary | ICD-10-CM

## 2020-06-29 NOTE — TELEPHONE ENCOUNTER
From: Alva Olivas  To:  Susan Jameson MD  Sent: 6/29/2020 1:06 PM CDT  Subject: Prescription Question    Afternoon Dr Jayden Tran  Please send my script for Synthroid to the [facility].  I don't believe I can take the generic,I tried it once years ago and had le

## 2020-06-30 RX ORDER — LEVOTHYROXINE SODIUM 0.03 MG/1
25 TABLET ORAL
Qty: 90 TABLET | Refills: 0 | Status: SHIPPED | OUTPATIENT
Start: 2020-06-30 | End: 2020-09-23

## 2020-06-30 RX ORDER — LEVOTHYROXINE SODIUM 0.2 MG/1
200 TABLET ORAL
Qty: 90 TABLET | Refills: 0 | Status: SHIPPED | OUTPATIENT
Start: 2020-06-30 | End: 2020-09-23

## 2020-07-15 DIAGNOSIS — R91.1 LUNG NODULE: ICD-10-CM

## 2020-07-15 DIAGNOSIS — Z12.11 SCREENING FOR COLON CANCER: ICD-10-CM

## 2020-07-15 DIAGNOSIS — Z12.39 SCREENING FOR BREAST CANCER: ICD-10-CM

## 2020-07-15 DIAGNOSIS — J45.20 MILD INTERMITTENT ASTHMA WITHOUT COMPLICATION: ICD-10-CM

## 2020-07-15 DIAGNOSIS — I10 ESSENTIAL HYPERTENSION: ICD-10-CM

## 2020-07-15 DIAGNOSIS — E03.9 HYPOTHYROIDISM, UNSPECIFIED TYPE: ICD-10-CM

## 2020-07-16 RX ORDER — BUDESONIDE AND FORMOTEROL FUMARATE DIHYDRATE 80; 4.5 UG/1; UG/1
2 AEROSOL RESPIRATORY (INHALATION) 2 TIMES DAILY
Qty: 3 INHALER | Refills: 0 | Status: SHIPPED | OUTPATIENT
Start: 2020-07-16 | End: 2020-07-22

## 2020-08-31 RX ORDER — MONTELUKAST SODIUM 10 MG/1
TABLET ORAL
Qty: 90 TABLET | Refills: 0 | Status: SHIPPED | OUTPATIENT
Start: 2020-08-31 | End: 2020-12-22

## 2020-09-23 RX ORDER — LEVOTHYROXINE SODIUM 0.03 MG/1
25 TABLET ORAL
Qty: 90 TABLET | Refills: 0 | Status: SHIPPED | OUTPATIENT
Start: 2020-09-23 | End: 2020-12-21

## 2020-09-23 RX ORDER — LEVOTHYROXINE SODIUM 0.2 MG/1
200 TABLET ORAL
Qty: 90 TABLET | Refills: 0 | Status: SHIPPED | OUTPATIENT
Start: 2020-09-23 | End: 2020-12-21

## 2020-09-29 ENCOUNTER — OFFICE VISIT (OUTPATIENT)
Dept: INTERNAL MEDICINE CLINIC | Facility: CLINIC | Age: 60
End: 2020-09-29
Payer: COMMERCIAL

## 2020-09-29 ENCOUNTER — TELEPHONE (OUTPATIENT)
Dept: INTERNAL MEDICINE CLINIC | Facility: CLINIC | Age: 60
End: 2020-09-29

## 2020-09-29 VITALS
RESPIRATION RATE: 16 BRPM | BODY MASS INDEX: 21.97 KG/M2 | OXYGEN SATURATION: 97 % | SYSTOLIC BLOOD PRESSURE: 116 MMHG | HEART RATE: 85 BPM | DIASTOLIC BLOOD PRESSURE: 78 MMHG | WEIGHT: 140 LBS | HEIGHT: 67 IN

## 2020-09-29 DIAGNOSIS — E03.9 HYPOTHYROIDISM, UNSPECIFIED TYPE: ICD-10-CM

## 2020-09-29 DIAGNOSIS — M54.2 NECK PAIN: ICD-10-CM
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
## 2020-09-29 LAB — TSI SER-ACNC: 0.42 MIU/ML (ref 0.36–3.74)

## 2020-09-29 PROCEDURE — 99214 OFFICE O/P EST MOD 30 MIN: CPT | Performed by: INTERNAL MEDICINE

## 2020-09-29 PROCEDURE — 3074F SYST BP LT 130 MM HG: CPT | Performed by: INTERNAL MEDICINE

## 2020-09-29 PROCEDURE — 84443 ASSAY THYROID STIM HORMONE: CPT | Performed by: INTERNAL MEDICINE

## 2020-09-29 PROCEDURE — 3008F BODY MASS INDEX DOCD: CPT | Performed by: INTERNAL MEDICINE

## 2020-09-29 PROCEDURE — 3078F DIAST BP <80 MM HG: CPT | Performed by: INTERNAL MEDICINE

## 2020-09-29 RX ORDER — NAPROXEN 500 MG/1
TABLET ORAL
COMMUNITY
Start: 2020-09-27

## 2020-09-29 RX ORDER — CYCLOBENZAPRINE HCL 10 MG
10 TABLET ORAL
COMMUNITY
Start: 2020-09-27
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
## 2020-09-29 NOTE — TELEPHONE ENCOUNTER
Patient going to [facility] for the Cervical CT. They are asking if it is with Or without contrast.  Also order should read facility as [facility]. I believe she'll need to access it from [employer] once submitted.

## 2020-10-06 ENCOUNTER — TELEPHONE (OUTPATIENT)
Dept: INTERNAL MEDICINE CLINIC | Facility: CLINIC | Age: 60
End: 2020-10-06
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
## 2020-10-06 DIAGNOSIS — M54.10 RADICULOPATHY, UNSPECIFIED SPINAL REGION: Primary | ICD-10-CM

## 2020-10-06 NOTE — TELEPHONE ENCOUNTER
Discussed with the patient   CT cervical spine   Djd, spinal stenosis and bone spurs mild to moderate     Advised PT and referral to physiatry       Also with moderate distension of vein: advised to have the Ct chest follow up
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
## 2020-11-11 ENCOUNTER — TELEPHONE (OUTPATIENT)
Dept: INTERNAL MEDICINE CLINIC | Facility: CLINIC | Age: 60
End: 2020-11-11

## 2020-11-11 DIAGNOSIS — M47.812 OSTEOARTHRITIS OF CERVICAL SPINE, UNSPECIFIED SPINAL OSTEOARTHRITIS COMPLICATION STATUS: ICD-10-CM

## 2020-11-11 DIAGNOSIS — M54.2 NECK PAIN: Primary | ICD-10-CM

## 2020-11-11 NOTE — TELEPHONE ENCOUNTER
LEFT MESSAGE ON NURSE LINE:     Needs a referral for PT for [facility].    Fax: [phone]    Dr. Baltazar Robins added order but its internal.

## 2020-11-12 ENCOUNTER — PATIENT MESSAGE (OUTPATIENT)
Dept: INTERNAL MEDICINE CLINIC | Facility: CLINIC | Age: 60
End: 2020-11-12

## 2020-11-12 RX ORDER — GABAPENTIN 300 MG/1
300 CAPSULE ORAL 3 TIMES DAILY
Qty: 90 CAPSULE | Refills: 0 | Status: SHIPPED | OUTPATIENT
Start: 2020-11-12 | End: 2021-02-08

## 2020-11-12 RX ORDER — MELOXICAM 15 MG/1
15 TABLET ORAL DAILY PRN
Qty: 30 TABLET | Refills: 0 | Status: SHIPPED | OUTPATIENT
Start: 2020-11-12 | End: 2020-12-21

## 2020-11-12 NOTE — TELEPHONE ENCOUNTER
Mohini Herron 11/12/2020 11:54 AM CST      ----- Message -----  From: Lizbeth Levi  Sent: 11/12/2020 11:21 AM CST  To: Meghan Bustamante Clinical Staff  Subject: RE: Prescription Question     Yes please. [facility]).  Thank you so

## 2020-11-12 NOTE — TELEPHONE ENCOUNTER
[facility]    Physical therapy clinic in [place], [place]  Address: [address], [place], [address] [address]      Phone: [phone]: [phone].

## 2020-11-16 ENCOUNTER — TELEPHONE (OUTPATIENT)
Dept: INTERNAL MEDICINE CLINIC | Facility: CLINIC | Age: 60
End: 2020-11-16

## 2020-12-21 RX ORDER — LEVOTHYROXINE SODIUM 0.2 MG/1
200 TABLET ORAL
Qty: 90 TABLET | Refills: 0 | Status: SHIPPED | OUTPATIENT
Start: 2020-12-21 | End: 2021-03-17

## 2020-12-21 RX ORDER — LEVOTHYROXINE SODIUM 0.03 MG/1
25 TABLET ORAL
Qty: 90 TABLET | Refills: 0 | Status: SHIPPED | OUTPATIENT
Start: 2020-12-21 | End: 2021-03-18

## 2020-12-21 RX ORDER — MELOXICAM 15 MG/1
TABLET ORAL
Qty: 30 TABLET | Refills: 0 | Status: SHIPPED | OUTPATIENT
Start: 2020-12-21 | End: 2021-01-18

## 2020-12-22 RX ORDER — MONTELUKAST SODIUM 10 MG/1
TABLET ORAL
Qty: 90 TABLET | Refills: 0 | Status: SHIPPED | OUTPATIENT
Start: 2020-12-22 | End: 2021-08-09

## 2021-01-05 DIAGNOSIS — Z12.11 SCREENING FOR COLON CANCER: ICD-10-CM

## 2021-01-05 DIAGNOSIS — Z00.00 ANNUAL PHYSICAL EXAM: ICD-10-CM

## 2021-01-05 RX ORDER — LISINOPRIL 5 MG/1
TABLET ORAL
Qty: 90 TABLET | Refills: 0 | Status: SHIPPED | OUTPATIENT
Start: 2021-01-05 | End: 2021-02-06

## 2021-01-06 ENCOUNTER — OFFICE VISIT (OUTPATIENT)
Dept: NEUROLOGY | Facility: CLINIC | Age: 61
End: 2021-01-06
Payer: COMMERCIAL

## 2021-01-06 ENCOUNTER — HOSPITAL ENCOUNTER (OUTPATIENT)
Dept: GENERAL RADIOLOGY | Age: 61
Discharge: HOME | End: 2021-01-06
Attending: PHYSICAL MEDICINE & REHABILITATION
Payer: COMMERCIAL

## 2021-01-06 ENCOUNTER — TELEPHONE (OUTPATIENT)
Dept: NEUROLOGY | Facility: CLINIC | Age: 61
End: 2021-01-06

## 2021-01-06 VITALS — WEIGHT: 140 LBS | HEIGHT: 67 IN | BODY MASS INDEX: 21.97 KG/M2

## 2021-01-06 DIAGNOSIS — M50.30 DDD (DEGENERATIVE DISC DISEASE), CERVICAL: ICD-10-CM

## 2021-01-06 DIAGNOSIS — M54.2 TRIGGER POINT OF NECK: ICD-10-CM

## 2021-01-06 DIAGNOSIS — M48.02 FORAMINAL STENOSIS OF CERVICAL REGION: ICD-10-CM

## 2021-01-06 DIAGNOSIS — M25.78 DEGENERATION OF INTERVERTEBRAL DISC OF CERVICAL REGION WITH OSTEOPHYTE OF CERVICAL VERTEBRA: ICD-10-CM

## 2021-01-06 DIAGNOSIS — M50.30 DEGENERATION OF INTERVERTEBRAL DISC OF CERVICAL REGION WITH OSTEOPHYTE OF CERVICAL VERTEBRA: ICD-10-CM

## 2021-01-06 DIAGNOSIS — M47.812 CERVICAL FACET SYNDROME: ICD-10-CM
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
## 2021-01-06 DIAGNOSIS — I10 ESSENTIAL HYPERTENSION: ICD-10-CM

## 2021-01-06 DIAGNOSIS — M50.30 DDD (DEGENERATIVE DISC DISEASE), CERVICAL: Primary | ICD-10-CM

## 2021-01-06 PROCEDURE — 72050 X-RAY EXAM NECK SPINE 4/5VWS: CPT | Performed by: PHYSICAL MEDICINE & REHABILITATION

## 2021-01-06 PROCEDURE — 99244 OFF/OP CNSLTJ NEW/EST MOD 40: CPT | Performed by: PHYSICAL MEDICINE & REHABILITATION

## 2021-01-06 PROCEDURE — 3008F BODY MASS INDEX DOCD: CPT | Performed by: PHYSICAL MEDICINE & REHABILITATION

## 2021-01-06 RX ORDER — NAPROXEN 500 MG/1
500 TABLET ORAL 2 TIMES DAILY WITH MEALS
Qty: 60 TABLET | Refills: 0 | Status: SHIPPED | OUTPATIENT
Start: 2021-01-06 | End: 2021-01-29

## 2021-01-06 NOTE — TELEPHONE ENCOUNTER
Informed patient MRI is without contrast. Faxed MRI order to [facility] w/ without contrast added to order.

## 2021-01-06 NOTE — TELEPHONE ENCOUNTER
MRI SPINE CERVICAL (CPT=72141)-pending approval  Called [facility] for authorization of approval for above. Per automated system authorization is required form [employer]. Called [employer], initiates authorization for approval for above.  Spoke to [address] [place] [address] Regis  who initiated req
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
## 2021-01-06 NOTE — PATIENT INSTRUCTIONS
1) Get XR of the neck today on your way out  2) My office will call you once the MRI is approved by your insurance.  You should then schedule the MRI and call my office again to make an appointment with me 2-3 days after your exam for review of your images

## 2021-01-06 NOTE — TELEPHONE ENCOUNTER
Pt called in reference to her MRI order. Pt wants to know if MRI if w/ or w/o contrast. Please advise.  Please fax paperwork to Aneesh Guzmán at [phone]

## 2021-01-06 NOTE — TELEPHONE ENCOUNTER
UPDATE: Clinical notes sent to [facility] for review. Tracking # [id number]    Called [facility], spoke to Sindi Waller who updated facility to [facility].    Reference call # [phone]

## 2021-01-07 NOTE — TELEPHONE ENCOUNTER
MRI SPINE CERVICAL (CPT=72141)-APPROVED     Rad MD online, request above is approved. Authorization # [id number]  effective date: 1/6/2021 to 2/5/2021. Patient notified via [employer]  RAD MD determination letter sent to scanning.

## 2021-01-13 ENCOUNTER — TELEPHONE (OUTPATIENT)
Dept: NEUROLOGY | Facility: CLINIC | Age: 61
End: 2021-01-13

## 2021-01-18 RX ORDER — MELOXICAM 15 MG/1
15 TABLET ORAL
Qty: 15 TABLET | Refills: 0 | Status: SHIPPED | OUTPATIENT
Start: 2021-01-18

## 2021-01-18 NOTE — TELEPHONE ENCOUNTER
Called patient back and discussed the MRI findings of the cervical spine. She does have degenerative disc disease at C5-C6 and C6-C7 with broad based central disc bulge at C5-C6 and C6-C7.   There is bilateral neuroforaminal stenosis at C5-C6 (moderate rig

## 2021-01-29 DIAGNOSIS — M54.2 TRIGGER POINT OF NECK: ICD-10-CM

## 2021-01-29 DIAGNOSIS — M47.812 CERVICAL FACET SYNDROME: ICD-10-CM

## 2021-01-29 DIAGNOSIS — M50.30 DEGENERATION OF INTERVERTEBRAL DISC OF CERVICAL REGION WITH OSTEOPHYTE OF CERVICAL VERTEBRA: ICD-10-CM

## 2021-01-29 DIAGNOSIS — M25.78 DEGENERATION OF INTERVERTEBRAL DISC OF CERVICAL REGION WITH OSTEOPHYTE OF CERVICAL VERTEBRA: ICD-10-CM

## 2021-01-29 DIAGNOSIS — M48.02 FORAMINAL STENOSIS OF CERVICAL REGION: ICD-10-CM

## 2021-01-29 DIAGNOSIS — M50.30 DDD (DEGENERATIVE DISC DISEASE), CERVICAL: ICD-10-CM

## 2021-01-29 DIAGNOSIS — I10 ESSENTIAL HYPERTENSION: ICD-10-CM

## 2021-01-29 RX ORDER — NAPROXEN 500 MG/1
TABLET ORAL
Qty: 60 TABLET | Refills: 0 | Status: SHIPPED | OUTPATIENT
Start: 2021-02-01 | End: 2021-03-17

## 2021-01-29 NOTE — TELEPHONE ENCOUNTER
Medication request: Naproxen (NAPROSYN) 500 MG Oral Tab     Take 1 tablet (500 mg total) by mouth 2 (two) times daily with meals. Take for 2 weeks as directed and then as needed.     LOV: 1/6/2021  NOV: None     [place] 1/6/21 Qty#60 r-0

## 2021-02-06 DIAGNOSIS — Z12.11 SCREENING FOR COLON CANCER: ICD-10-CM

## 2021-02-06 DIAGNOSIS — Z00.00 ANNUAL PHYSICAL EXAM: ICD-10-CM

## 2021-02-06 RX ORDER — LISINOPRIL 5 MG/1
TABLET ORAL
Qty: 90 TABLET | Refills: 0 | Status: SHIPPED | OUTPATIENT
Start: 2021-02-06 | End: 2021-08-09

## 2021-02-08 RX ORDER — GABAPENTIN 300 MG/1
CAPSULE ORAL
Qty: 90 CAPSULE | Refills: 0 | Status: SHIPPED | OUTPATIENT
Start: 2021-02-08

## 2021-03-17 ENCOUNTER — OFFICE VISIT (OUTPATIENT)
Dept: INTERNAL MEDICINE CLINIC | Facility: CLINIC | Age: 61
End: 2021-03-17
Payer: COMMERCIAL

## 2021-03-17 VITALS
BODY MASS INDEX: 22.23 KG/M2 | OXYGEN SATURATION: 97 % | HEIGHT: 67 IN | HEART RATE: 69 BPM | SYSTOLIC BLOOD PRESSURE: 139 MMHG | DIASTOLIC BLOOD PRESSURE: 88 MMHG | WEIGHT: 141.63 LBS

## 2021-03-17 DIAGNOSIS — Z13.820 SCREENING FOR OSTEOPOROSIS: ICD-10-CM

## 2021-03-17 DIAGNOSIS — I25.10 ATHEROSCLEROSIS OF CORONARY ARTERY OF NATIVE HEART WITHOUT ANGINA PECTORIS, UNSPECIFIED VESSEL OR LESION TYPE: ICD-10-CM

## 2021-03-17 DIAGNOSIS — Z12.39 ENCOUNTER FOR SCREENING FOR MALIGNANT NEOPLASM OF BREAST, UNSPECIFIED SCREENING MODALITY: ICD-10-CM

## 2021-03-17 DIAGNOSIS — J45.20 MILD INTERMITTENT ASTHMA WITHOUT COMPLICATION: ICD-10-CM

## 2021-03-17 DIAGNOSIS — R79.89 ABNORMAL CBC: ICD-10-CM

## 2021-03-17 DIAGNOSIS — E03.9 HYPOTHYROIDISM, UNSPECIFIED TYPE: ICD-10-CM

## 2021-03-17 DIAGNOSIS — M47.812 OSTEOARTHRITIS OF CERVICAL SPINE, UNSPECIFIED SPINAL OSTEOARTHRITIS COMPLICATION STATUS: ICD-10-CM

## 2021-03-17 DIAGNOSIS — I10 ESSENTIAL HYPERTENSION: ICD-10-CM

## 2021-03-17 DIAGNOSIS — I34.0 MITRAL VALVE INSUFFICIENCY, UNSPECIFIED ETIOLOGY: ICD-10-CM

## 2021-03-17 DIAGNOSIS — R91.8 LUNG NODULES: ICD-10-CM

## 2021-03-17 DIAGNOSIS — Z00.00 ANNUAL PHYSICAL EXAM: Primary | ICD-10-CM

## 2021-03-17 DIAGNOSIS — J43.9 PULMONARY EMPHYSEMA, UNSPECIFIED EMPHYSEMA TYPE (HCC): ICD-10-CM

## 2021-03-17 DIAGNOSIS — Z87.891 HISTORY OF SMOKING: ICD-10-CM

## 2021-03-17 LAB
ALBUMIN SERPL-MCNC: 4.2 G/DL (ref 3.4–5)
ALBUMIN/GLOB SERPL: 1.2 {RATIO} (ref 1–2)
ALP LIVER SERPL-CCNC: 84 U/L
ALT SERPL-CCNC: 22 U/L
ANION GAP SERPL CALC-SCNC: 1 MMOL/L (ref 0–18)
AST SERPL-CCNC: 14 U/L (ref 15–37)
BASOPHILS # BLD AUTO: 0.07 X10(3) UL (ref 0–0.2)
BASOPHILS NFR BLD AUTO: 0.8 %
BILIRUB SERPL-MCNC: 0.4 MG/DL (ref 0.1–2)
BILIRUB UR QL: NEGATIVE
BUN BLD-MCNC: 15 MG/DL (ref 7–18)
BUN/CREAT SERPL: 18.5 (ref 10–20)
CALCIUM BLD-MCNC: 10.3 MG/DL (ref 8.5–10.1)
CHLORIDE SERPL-SCNC: 107 MMOL/L (ref 98–112)
CHOLEST SMN-MCNC: 217 MG/DL (ref ?–200)
CLARITY UR: CLEAR
CO2 SERPL-SCNC: 30 MMOL/L (ref 21–32)
COLOR UR: YELLOW
CREAT BLD-MCNC: 0.81 MG/DL
DEPRECATED RDW RBC AUTO: 44.6 FL (ref 35.1–46.3)
EOSINOPHIL # BLD AUTO: 0.57 X10(3) UL (ref 0–0.7)
EOSINOPHIL NFR BLD AUTO: 6.8 %
ERYTHROCYTE [DISTWIDTH] IN BLOOD BY AUTOMATED COUNT: 12.4 % (ref 11–15)
GLOBULIN PLAS-MCNC: 3.4 G/DL (ref 2.8–4.4)
GLUCOSE BLD-MCNC: 84 MG/DL (ref 70–99)
GLUCOSE UR-MCNC: NEGATIVE MG/DL
HCT VFR BLD AUTO: 44.4 %
HDLC SERPL-MCNC: 61 MG/DL (ref 40–59)
HGB BLD-MCNC: 13.9 G/DL
HGB UR QL STRIP.AUTO: NEGATIVE
IMM GRANULOCYTES # BLD AUTO: 0.03 X10(3) UL (ref 0–1)
IMM GRANULOCYTES NFR BLD: 0.4 %
KETONES UR-MCNC: NEGATIVE MG/DL
LDLC SERPL CALC-MCNC: 132 MG/DL (ref ?–100)
LEUKOCYTE ESTERASE UR QL STRIP.AUTO: NEGATIVE
LYMPHOCYTES # BLD AUTO: 1.68 X10(3) UL (ref 1–4)
LYMPHOCYTES NFR BLD AUTO: 20.1 %
M PROTEIN MFR SERPL ELPH: 7.6 G/DL (ref 6.4–8.2)
MCH RBC QN AUTO: 30.5 PG (ref 26–34)
MCHC RBC AUTO-ENTMCNC: 31.3 G/DL (ref 31–37)
MCV RBC AUTO: 97.4 FL
MONOCYTES # BLD AUTO: 0.84 X10(3) UL (ref 0.1–1)
MONOCYTES NFR BLD AUTO: 10 %
NEUTROPHILS # BLD AUTO: 5.18 X10 (3) UL (ref 1.5–7.7)
NEUTROPHILS # BLD AUTO: 5.18 X10(3) UL (ref 1.5–7.7)
NEUTROPHILS NFR BLD AUTO: 61.9 %
NITRITE UR QL STRIP.AUTO: NEGATIVE
NONHDLC SERPL-MCNC: 156 MG/DL (ref ?–130)
OSMOLALITY SERPL CALC.SUM OF ELEC: 286 MOSM/KG (ref 275–295)
PATIENT FASTING Y/N/NP: YES
PATIENT FASTING Y/N/NP: YES
PH UR: 6 [PH] (ref 5–8)
PLATELET # BLD AUTO: 451 10(3)UL (ref 150–450)
POTASSIUM SERPL-SCNC: 4.9 MMOL/L (ref 3.5–5.1)
PROT UR-MCNC: NEGATIVE MG/DL
RBC # BLD AUTO: 4.56 X10(6)UL
SODIUM SERPL-SCNC: 138 MMOL/L (ref 136–145)
SP GR UR STRIP: 1.01 (ref 1–1.03)
TRIGL SERPL-MCNC: 119 MG/DL (ref 30–149)
TSI SER-ACNC: 1.15 MIU/ML (ref 0.36–3.74)
UROBILINOGEN UR STRIP-ACNC: <2
VLDLC SERPL CALC-MCNC: 24 MG/DL (ref 0–30)
WBC # BLD AUTO: 8.4 X10(3) UL (ref 4–11)

## 2021-03-17 PROCEDURE — 83540 ASSAY OF IRON: CPT | Performed by: INTERNAL MEDICINE

## 2021-03-17 PROCEDURE — 99396 PREV VISIT EST AGE 40-64: CPT | Performed by: INTERNAL MEDICINE

## 2021-03-17 PROCEDURE — 80061 LIPID PANEL: CPT | Performed by: INTERNAL MEDICINE

## 2021-03-17 PROCEDURE — 36415 COLL VENOUS BLD VENIPUNCTURE: CPT | Performed by: INTERNAL MEDICINE

## 2021-03-17 PROCEDURE — 3079F DIAST BP 80-89 MM HG: CPT | Performed by: INTERNAL MEDICINE

## 2021-03-17 PROCEDURE — 3075F SYST BP GE 130 - 139MM HG: CPT | Performed by: INTERNAL MEDICINE

## 2021-03-17 PROCEDURE — 80050 GENERAL HEALTH PANEL: CPT | Performed by: INTERNAL MEDICINE

## 2021-03-17 PROCEDURE — 82728 ASSAY OF FERRITIN: CPT | Performed by: INTERNAL MEDICINE

## 2021-03-17 PROCEDURE — 81003 URINALYSIS AUTO W/O SCOPE: CPT | Performed by: INTERNAL MEDICINE

## 2021-03-17 PROCEDURE — 3008F BODY MASS INDEX DOCD: CPT | Performed by: INTERNAL MEDICINE

## 2021-03-17 PROCEDURE — 84466 ASSAY OF TRANSFERRIN: CPT | Performed by: INTERNAL MEDICINE

## 2021-03-17 NOTE — PROGRESS NOTES
Kalyan Young is a 56year old female.     Chief complaint: annual physical exam       HPI:     Kalyan Young is a 56year old female who presents for annual physical exam      No chest pain no sob no abdominal pain  No diarrhea or constipation   No fev 100 MCG Oral Lozenge Take by mouth.      • GABAPENTIN 300 MG Oral Cap TAKE 1 CAPSULE BY MOUTH AT NIGHT FOR 1 WEEK, 1 CAPSULE TWICE DAILY FOR 1 WEEK, THEN 1 CAPSULE 3X/DAILY AFTER THAT (Patient not taking: Reported on 3/17/2021) 90 capsule 0      Past Medica Finalized by (CST): Shukri Mccoy MD on 1/06/2021 at 11:36 AM                 ASSESSMENT AND PLAN:     1. Screening for osteoporosis  Dexa scan   - XR DEXA BONE DENSITOMETRY (CPT=77080); Future  - NA SCREENING BILAT (CPT=77067);  Future  - CT MELODY Follow up   Stopped smoking   - XR DEXA BONE DENSITOMETRY (CPT=57395); Future  - NA SCREENING BILAT (CPT=88425); Future  - CT LUNG LD SCREENING(CPT=71661); Future  - CBC WITH DIFFERENTIAL WITH PLATELET; Future  - COMP METABOLIC PANEL (14);  Future  - LIP year  - XR DEXA BONE DENSITOMETRY (CPT=77080); Future  - NA SCREENING BILAT (CPT=77038); Future  - CT LUNG LD SCREENING(CPT=71271); Future  - CBC WITH DIFFERENTIAL WITH PLATELET; Future  - COMP METABOLIC PANEL (14); Future  - LIPID PANEL;  Future  - TSH W Future  - CT LUNG LD SCREENING(CPT=71271); Future  - CBC WITH DIFFERENTIAL WITH PLATELET; Future  - COMP METABOLIC PANEL (14); Future  - LIPID PANEL; Future  - TSH W REFLEX TO FREE T4; Future  - OCCULT BLOOD, FECAL, IMMUNOASSAY; Future  - EKG 12-LEAD;  Futu

## 2021-03-18 DIAGNOSIS — R79.89 ABNORMAL CBC: Primary | ICD-10-CM

## 2021-03-18 DIAGNOSIS — I25.10 ATHEROSCLEROSIS OF CORONARY ARTERY WITHOUT ANGINA PECTORIS, UNSPECIFIED VESSEL OR LESION TYPE, UNSPECIFIED WHETHER NATIVE OR TRANSPLANTED HEART: Primary | ICD-10-CM

## 2021-03-18 LAB
DEPRECATED HBV CORE AB SER IA-ACNC: 40.6 NG/ML
IRON SATURATION: 17 %
IRON SERPL-MCNC: 63 UG/DL
TOTAL IRON BINDING CAPACITY: 370 UG/DL (ref 240–450)
TRANSFERRIN SERPL-MCNC: 248 MG/DL (ref 200–360)

## 2021-03-18 RX ORDER — ATORVASTATIN CALCIUM 10 MG/1
10 TABLET, FILM COATED ORAL DAILY
Qty: 90 TABLET | Refills: 1 | Status: SHIPPED | OUTPATIENT
Start: 2021-03-18 | End: 2021-09-14

## 2021-03-18 RX ORDER — LEVOTHYROXINE SODIUM 0.2 MG/1
200 TABLET ORAL
Qty: 90 TABLET | Refills: 1 | Status: SHIPPED | OUTPATIENT
Start: 2021-03-18 | End: 2021-12-13

## 2021-03-18 RX ORDER — LEVOTHYROXINE SODIUM 0.03 MG/1
25 TABLET ORAL
Qty: 90 TABLET | Refills: 1 | Status: SHIPPED | OUTPATIENT
Start: 2021-03-18 | End: 2021-12-13

## 2021-03-22 ENCOUNTER — TELEPHONE (OUTPATIENT)
Dept: INTERNAL MEDICINE CLINIC | Facility: CLINIC | Age: 61
End: 2021-03-22

## 2021-03-22 NOTE — TELEPHONE ENCOUNTER
[facility] is needing the orders faxed to them at [phone]. Lung CT  Mammogram  Dexa scan  EKG    Didn't leave a contact name.

## 2021-03-23 ENCOUNTER — TELEPHONE (OUTPATIENT)
Dept: INTERNAL MEDICINE CLINIC | Facility: CLINIC | Age: 61
End: 2021-03-23

## 2021-03-23 NOTE — TELEPHONE ENCOUNTER
Was told by referral team that using [facility] is considered out of network and may be more expensive for her OOP. Patient to call office back to let us know what she plans to do. If she wants [facility], the referrals need to be changed.

## 2021-03-23 NOTE — TELEPHONE ENCOUNTER
Patient left message that she already had the imaging done at [facility]. Referral team notified. Able to get referrals changed to [facility].

## 2021-04-02 ENCOUNTER — TELEPHONE (OUTPATIENT)
Dept: INTERNAL MEDICINE CLINIC | Facility: CLINIC | Age: 61
End: 2021-04-02

## 2021-04-09 ENCOUNTER — TELEPHONE (OUTPATIENT)
Dept: INTERNAL MEDICINE CLINIC | Facility: CLINIC | Age: 61
End: 2021-04-09

## 2021-04-09 NOTE — TELEPHONE ENCOUNTER
Medical records release form was received from [address] [place] [address] [address] [facility], [address] [place] [address] Susi  Ph. [phone] Fax [phone] and sent to scan stat

## 2021-04-12 ENCOUNTER — TELEPHONE (OUTPATIENT)
Dept: INTERNAL MEDICINE CLINIC | Facility: CLINIC | Age: 61
End: 2021-04-12

## 2021-04-29 DIAGNOSIS — R93.1 HIGH CORONARY ARTERY CALCIUM SCORE: Primary | ICD-10-CM

## 2021-08-09 DIAGNOSIS — Z00.00 ANNUAL PHYSICAL EXAM: ICD-10-CM

## 2021-08-09 DIAGNOSIS — Z12.11 SCREENING FOR COLON CANCER: ICD-10-CM

## 2021-08-09 RX ORDER — LISINOPRIL 5 MG/1
TABLET ORAL
Qty: 90 TABLET | Refills: 0 | Status: SHIPPED | OUTPATIENT
Start: 2021-08-09

## 2021-08-09 RX ORDER — MONTELUKAST SODIUM 10 MG/1
TABLET ORAL
Qty: 90 TABLET | Refills: 0 | Status: SHIPPED | OUTPATIENT
Start: 2021-08-09 | End: 2022-01-28

## 2021-08-09 NOTE — TELEPHONE ENCOUNTER
Requested Prescriptions     Pending Prescriptions Disp Refills   • MONTELUKAST SODIUM 10 MG Oral Tab [Pharmacy Med Name: MONTELUKAST SOD 10 MG TABLET] 90 tablet 0     Sig: TAKE 1 TABLET BY MOUTH EVERY DAY IN THE EVENING   • LISINOPRIL 5 MG Oral Tab [Pharma

## 2021-09-14 RX ORDER — ATORVASTATIN CALCIUM 10 MG/1
TABLET, FILM COATED ORAL
Qty: 90 TABLET | Refills: 1 | Status: SHIPPED | OUTPATIENT
Start: 2021-09-14

## 2021-11-23 PROBLEM — J45.909 ASTHMA (HCC): Status: ACTIVE | Noted: 2021-11-23

## 2021-11-23 PROBLEM — J45.909 ASTHMA: Status: ACTIVE | Noted: 2021-11-23

## 2021-12-13 RX ORDER — LEVOTHYROXINE SODIUM 0.03 MG/1
25 TABLET ORAL
Qty: 90 TABLET | Refills: 1 | Status: SHIPPED | OUTPATIENT
Start: 2021-12-13 | End: 2022-03-13

## 2021-12-13 RX ORDER — LEVOTHYROXINE SODIUM 0.2 MG/1
200 TABLET ORAL
Qty: 90 TABLET | Refills: 1 | Status: SHIPPED | OUTPATIENT
Start: 2021-12-13 | End: 2022-03-13

## 2021-12-20 ENCOUNTER — OFFICE VISIT (OUTPATIENT)
Dept: INTERNAL MEDICINE CLINIC | Facility: CLINIC | Age: 61
End: 2021-12-20

## 2021-12-20 VITALS
OXYGEN SATURATION: 98 % | HEART RATE: 73 BPM | SYSTOLIC BLOOD PRESSURE: 130 MMHG | WEIGHT: 138.19 LBS | HEIGHT: 67 IN | BODY MASS INDEX: 21.69 KG/M2 | DIASTOLIC BLOOD PRESSURE: 82 MMHG | TEMPERATURE: 98 F

## 2021-12-20 DIAGNOSIS — Z87.891 HISTORY OF SMOKING: ICD-10-CM

## 2021-12-20 DIAGNOSIS — Z12.11 SCREENING FOR COLON CANCER: ICD-10-CM

## 2021-12-20 DIAGNOSIS — Z12.31 VISIT FOR SCREENING MAMMOGRAM: ICD-10-CM

## 2021-12-20 DIAGNOSIS — E03.9 HYPOTHYROIDISM, UNSPECIFIED TYPE: ICD-10-CM

## 2021-12-20 DIAGNOSIS — Z00.00 ANNUAL PHYSICAL EXAM: Primary | ICD-10-CM

## 2021-12-20 DIAGNOSIS — J45.20 MILD INTERMITTENT ASTHMA WITHOUT COMPLICATION: ICD-10-CM

## 2021-12-20 DIAGNOSIS — R93.1 ELEVATED CORONARY ARTERY CALCIUM SCORE: ICD-10-CM

## 2021-12-20 DIAGNOSIS — Z12.4 SCREENING FOR CERVICAL CANCER: ICD-10-CM

## 2021-12-20 DIAGNOSIS — Z12.83 SCREENING FOR SKIN CANCER: ICD-10-CM

## 2021-12-20 PROCEDURE — 3079F DIAST BP 80-89 MM HG: CPT | Performed by: INTERNAL MEDICINE

## 2021-12-20 PROCEDURE — 88175 CYTOPATH C/V AUTO FLUID REDO: CPT | Performed by: INTERNAL MEDICINE

## 2021-12-20 PROCEDURE — 3008F BODY MASS INDEX DOCD: CPT | Performed by: INTERNAL MEDICINE

## 2021-12-20 PROCEDURE — 3075F SYST BP GE 130 - 139MM HG: CPT | Performed by: INTERNAL MEDICINE

## 2021-12-20 PROCEDURE — 99396 PREV VISIT EST AGE 40-64: CPT | Performed by: INTERNAL MEDICINE

## 2021-12-20 PROCEDURE — G0438 PPPS, INITIAL VISIT: HCPCS | Performed by: INTERNAL MEDICINE

## 2021-12-20 NOTE — PROGRESS NOTES
Jazz Spivey is a 59year old female.     Chief complaint: annual physical exam       HPI:     Jazz Spivey is a 59year old pleasant female who presents for annual physical exam   No chest pain no sob no abdominal pain  No diarrhea or constipation FOR 1 WEEK, 1 CAPSULE TWICE DAILY FOR 1 WEEK, THEN 1 CAPSULE 3X/DAILY AFTER THAT (Patient not taking: No sig reported) 90 capsule 0      Past Medical History:   Diagnosis Date   • Asthma    • Essential hypertension    • Hypothyroidism      Past Surgical Hi Discussed the importance of having colonoscopy       - GASTRO - INTERNAL  - THINPREP PAP WITH HPV REFLEX REQUEST B; Future  - CBC WITH DIFFERENTIAL WITH PLATELET; Future  - COMP METABOLIC PANEL (14); Future  - LIPID PANEL;  Future  - TSH W REFLEX TO FREE URINALYSIS WITH CULTURE REFLEX; Future  - [facility] CHRISTIAN 2D+3D SCREENING BILAT (CPT=77067/12706); Future    6. Visit for screening mammogram  Mammogram 3/22   - [facility] CHRISTIAN 2D+3D SCREENING BILAT (CPT=77067/69459); Future    7.  Screening for skin cancer  Advised to

## 2022-01-31 RX ORDER — MONTELUKAST SODIUM 10 MG/1
10 TABLET ORAL EVERY EVENING
Qty: 90 TABLET | Refills: 0 | Status: SHIPPED | OUTPATIENT
Start: 2022-01-31

## 2022-01-31 RX ORDER — MONTELUKAST SODIUM 10 MG/1
TABLET ORAL
Qty: 90 TABLET | Refills: 0 | Status: SHIPPED | OUTPATIENT
Start: 2022-01-31

## 2022-01-31 NOTE — TELEPHONE ENCOUNTER
Asthma & COPD Medication Protocol Failed 01/25/2022 12:00 AM    Asthma Action Score greater than or equal to 20    AAP/ACT given in last 12 months    Appointment in past 6 or next 3 months      Last OV 12/20/21   No Future appt

## 2022-01-31 NOTE — TELEPHONE ENCOUNTER
Requested Prescriptions     Pending Prescriptions Disp Refills   • montelukast 10 MG Oral Tab 90 tablet 0     Sig: Take 1 tablet (10 mg total) by mouth every evening.      Last office visit: 12-20-21  Medication last refilled: 8-9-21

## 2022-03-07 RX ORDER — LISINOPRIL 5 MG/1
TABLET ORAL
Qty: 90 TABLET | Refills: 0 | Status: SHIPPED | OUTPATIENT
Start: 2022-03-07

## 2022-03-07 RX ORDER — MONTELUKAST SODIUM 10 MG/1
TABLET ORAL
Qty: 90 TABLET | Refills: 0 | Status: SHIPPED | OUTPATIENT
Start: 2022-03-07

## 2022-03-07 RX ORDER — ATORVASTATIN CALCIUM 10 MG/1
TABLET, FILM COATED ORAL
Qty: 90 TABLET | Refills: 1 | Status: SHIPPED | OUTPATIENT
Start: 2022-03-07

## 2022-06-03 DIAGNOSIS — Z12.11 SCREENING FOR COLON CANCER: ICD-10-CM

## 2022-06-03 DIAGNOSIS — Z00.00 ANNUAL PHYSICAL EXAM: ICD-10-CM

## 2022-06-05 RX ORDER — LISINOPRIL 5 MG/1
TABLET ORAL
Qty: 90 TABLET | Refills: 0 | Status: SHIPPED | OUTPATIENT
Start: 2022-06-05

## 2022-06-09 RX ORDER — LEVOTHYROXINE SODIUM 0.03 MG/1
TABLET ORAL
Qty: 90 TABLET | Refills: 1 | OUTPATIENT
Start: 2022-06-09

## 2022-06-09 RX ORDER — LEVOTHYROXINE SODIUM 0.2 MG/1
200 TABLET ORAL
Qty: 90 TABLET | Refills: 1 | OUTPATIENT
Start: 2022-06-09 | End: 2022-09-07

## 2022-06-11 ENCOUNTER — OFFICE VISIT (OUTPATIENT)
Dept: INTERNAL MEDICINE CLINIC | Facility: CLINIC | Age: 62
End: 2022-06-11
Payer: COMMERCIAL

## 2022-06-11 VITALS
OXYGEN SATURATION: 99 % | BODY MASS INDEX: 21.69 KG/M2 | HEIGHT: 67 IN | WEIGHT: 138.19 LBS | HEART RATE: 77 BPM | DIASTOLIC BLOOD PRESSURE: 88 MMHG | SYSTOLIC BLOOD PRESSURE: 120 MMHG

## 2022-06-11 DIAGNOSIS — Z12.11 SCREENING FOR COLON CANCER: ICD-10-CM

## 2022-06-11 DIAGNOSIS — K13.0: ICD-10-CM

## 2022-06-11 DIAGNOSIS — I10 PRIMARY HYPERTENSION: Primary | ICD-10-CM

## 2022-06-11 DIAGNOSIS — I10 ESSENTIAL HYPERTENSION: ICD-10-CM

## 2022-06-11 DIAGNOSIS — K13.0 CHAPPED LIPS: ICD-10-CM

## 2022-06-11 DIAGNOSIS — E03.9 HYPOTHYROIDISM, UNSPECIFIED TYPE: ICD-10-CM

## 2022-06-11 DIAGNOSIS — I25.10 ATHEROSCLEROSIS OF CORONARY ARTERY OF NATIVE HEART WITHOUT ANGINA PECTORIS, UNSPECIFIED VESSEL OR LESION TYPE: ICD-10-CM

## 2022-06-11 DIAGNOSIS — J45.20 MILD INTERMITTENT ASTHMA WITHOUT COMPLICATION: ICD-10-CM

## 2022-06-11 LAB
ALBUMIN SERPL-MCNC: 4 G/DL (ref 3.4–5)
ALBUMIN/GLOB SERPL: 1.1 {RATIO} (ref 1–2)
ALP LIVER SERPL-CCNC: 123 U/L
ALT SERPL-CCNC: 36 U/L
ANION GAP SERPL CALC-SCNC: 4 MMOL/L (ref 0–18)
AST SERPL-CCNC: 25 U/L (ref 15–37)
BASOPHILS # BLD AUTO: 0.04 X10(3) UL (ref 0–0.2)
BASOPHILS NFR BLD AUTO: 0.4 %
BILIRUB SERPL-MCNC: 0.7 MG/DL (ref 0.1–2)
BILIRUB UR QL: NEGATIVE
BUN BLD-MCNC: 15 MG/DL (ref 7–18)
BUN/CREAT SERPL: 20.3 (ref 10–20)
CALCIUM BLD-MCNC: 10.6 MG/DL (ref 8.5–10.1)
CHLORIDE SERPL-SCNC: 106 MMOL/L (ref 98–112)
CHOLEST SERPL-MCNC: 147 MG/DL (ref ?–200)
CLARITY UR: CLEAR
CO2 SERPL-SCNC: 29 MMOL/L (ref 21–32)
COLOR UR: YELLOW
CREAT BLD-MCNC: 0.74 MG/DL
DEPRECATED RDW RBC AUTO: 41.7 FL (ref 35.1–46.3)
EOSINOPHIL # BLD AUTO: 0.28 X10(3) UL (ref 0–0.7)
EOSINOPHIL NFR BLD AUTO: 3.1 %
ERYTHROCYTE [DISTWIDTH] IN BLOOD BY AUTOMATED COUNT: 12.1 % (ref 11–15)
FASTING PATIENT LIPID ANSWER: YES
FASTING STATUS PATIENT QL REPORTED: YES
GLOBULIN PLAS-MCNC: 3.7 G/DL (ref 2.8–4.4)
GLUCOSE BLD-MCNC: 87 MG/DL (ref 70–99)
GLUCOSE UR-MCNC: NEGATIVE MG/DL
HCT VFR BLD AUTO: 45.7 %
HDLC SERPL-MCNC: 74 MG/DL (ref 40–59)
HGB BLD-MCNC: 14.3 G/DL
HGB UR QL STRIP.AUTO: NEGATIVE
IMM GRANULOCYTES # BLD AUTO: 0.02 X10(3) UL (ref 0–1)
IMM GRANULOCYTES NFR BLD: 0.2 %
IRON SATN MFR SERPL: 31 %
IRON SERPL-MCNC: 129 UG/DL
KETONES UR-MCNC: NEGATIVE MG/DL
LDLC SERPL CALC-MCNC: 59 MG/DL (ref ?–100)
LEUKOCYTE ESTERASE UR QL STRIP.AUTO: NEGATIVE
LYMPHOCYTES # BLD AUTO: 2.12 X10(3) UL (ref 1–4)
LYMPHOCYTES NFR BLD AUTO: 23.6 %
MCH RBC QN AUTO: 29.3 PG (ref 26–34)
MCHC RBC AUTO-ENTMCNC: 31.3 G/DL (ref 31–37)
MCV RBC AUTO: 93.6 FL
MONOCYTES # BLD AUTO: 0.69 X10(3) UL (ref 0.1–1)
MONOCYTES NFR BLD AUTO: 7.7 %
NEUTROPHILS # BLD AUTO: 5.82 X10 (3) UL (ref 1.5–7.7)
NEUTROPHILS # BLD AUTO: 5.82 X10(3) UL (ref 1.5–7.7)
NEUTROPHILS NFR BLD AUTO: 65 %
NITRITE UR QL STRIP.AUTO: NEGATIVE
NONHDLC SERPL-MCNC: 73 MG/DL (ref ?–130)
OSMOLALITY SERPL CALC.SUM OF ELEC: 288 MOSM/KG (ref 275–295)
PH UR: 7 [PH] (ref 5–8)
PLATELET # BLD AUTO: 360 10(3)UL (ref 150–450)
POTASSIUM SERPL-SCNC: 5 MMOL/L (ref 3.5–5.1)
PROT SERPL-MCNC: 7.7 G/DL (ref 6.4–8.2)
PROT UR-MCNC: NEGATIVE MG/DL
RBC # BLD AUTO: 4.88 X10(6)UL
SODIUM SERPL-SCNC: 139 MMOL/L (ref 136–145)
SP GR UR STRIP: 1.02 (ref 1–1.03)
T3FREE SERPL-MCNC: 4.11 PG/ML (ref 2.4–4.2)
T4 FREE SERPL-MCNC: 1.7 NG/DL (ref 0.8–1.7)
TIBC SERPL-MCNC: 414 UG/DL (ref 240–450)
TRANSFERRIN SERPL-MCNC: 278 MG/DL (ref 200–360)
TRIGL SERPL-MCNC: 68 MG/DL (ref 30–149)
TSI SER-ACNC: <0.005 MIU/ML (ref 0.36–3.74)
UROBILINOGEN UR STRIP-ACNC: 0.2
VIT B12 SERPL-MCNC: 748 PG/ML (ref 193–986)
VIT D+METAB SERPL-MCNC: 43.2 NG/ML (ref 30–100)
VLDLC SERPL CALC-MCNC: 10 MG/DL (ref 0–30)
WBC # BLD AUTO: 9 X10(3) UL (ref 4–11)

## 2022-06-11 PROCEDURE — 84481 FREE ASSAY (FT-3): CPT | Performed by: INTERNAL MEDICINE

## 2022-06-11 PROCEDURE — 82306 VITAMIN D 25 HYDROXY: CPT | Performed by: INTERNAL MEDICINE

## 2022-06-11 PROCEDURE — 84466 ASSAY OF TRANSFERRIN: CPT | Performed by: INTERNAL MEDICINE

## 2022-06-11 PROCEDURE — 3079F DIAST BP 80-89 MM HG: CPT | Performed by: INTERNAL MEDICINE

## 2022-06-11 PROCEDURE — 84443 ASSAY THYROID STIM HORMONE: CPT | Performed by: INTERNAL MEDICINE

## 2022-06-11 PROCEDURE — 80053 COMPREHEN METABOLIC PANEL: CPT | Performed by: INTERNAL MEDICINE

## 2022-06-11 PROCEDURE — 80061 LIPID PANEL: CPT | Performed by: INTERNAL MEDICINE

## 2022-06-11 PROCEDURE — 82607 VITAMIN B-12: CPT | Performed by: INTERNAL MEDICINE

## 2022-06-11 PROCEDURE — 85025 COMPLETE CBC W/AUTO DIFF WBC: CPT | Performed by: INTERNAL MEDICINE

## 2022-06-11 PROCEDURE — 3008F BODY MASS INDEX DOCD: CPT | Performed by: INTERNAL MEDICINE

## 2022-06-11 PROCEDURE — 3074F SYST BP LT 130 MM HG: CPT | Performed by: INTERNAL MEDICINE

## 2022-06-11 PROCEDURE — 83540 ASSAY OF IRON: CPT | Performed by: INTERNAL MEDICINE

## 2022-06-11 PROCEDURE — 84439 ASSAY OF FREE THYROXINE: CPT | Performed by: INTERNAL MEDICINE

## 2022-06-11 PROCEDURE — 99214 OFFICE O/P EST MOD 30 MIN: CPT | Performed by: INTERNAL MEDICINE

## 2022-06-27 ENCOUNTER — TELEPHONE (OUTPATIENT)
Dept: INTERNAL MEDICINE CLINIC | Facility: CLINIC | Age: 62
End: 2022-06-27

## 2022-06-27 ENCOUNTER — PATIENT MESSAGE (OUTPATIENT)
Dept: INTERNAL MEDICINE CLINIC | Facility: CLINIC | Age: 62
End: 2022-06-27

## 2022-06-27 DIAGNOSIS — D22.9 MULTIPLE PIGMENTED NEVI: Primary | ICD-10-CM

## 2022-06-27 NOTE — TELEPHONE ENCOUNTER
Patient wants a referral by this Wednesday to see a dermatologist that she found she can get into the office to see on Wednesday. I double checked with Ori Mendenhall and she said it can take a week to two weeks to get an appointment     And on 6/11 Dr. Joneen Castleman entered a referral to see Dr. Cristal Bashir. She said she made an appointment with Dr. Cristal Bashir but I don't see it listed and she can't get into the office until mid July. Needing a call back.

## 2022-06-27 NOTE — TELEPHONE ENCOUNTER
LVM for patient. 1) Need name of doctor she wants to see. 2) Did she call her insurance to assure this new doctor is in her network  3) if not one of our MDs, need name, address and p hannah number. 4) why does she need to see someone sooner?

## 2022-06-28 ENCOUNTER — PATIENT MESSAGE (OUTPATIENT)
Dept: INTERNAL MEDICINE CLINIC | Facility: CLINIC | Age: 62
End: 2022-06-28

## 2022-06-28 DIAGNOSIS — D22.9 MULTIPLE PIGMENTED NEVI: Primary | ICD-10-CM

## 2022-06-28 NOTE — TELEPHONE ENCOUNTER
Kati Currie, [facility] 6/28/2022 8:24 AM CDT    Please add   ----- Message -----  From: Cleve Hopkins  Sent: 6/28/2022 5:48 AM CDT  To: Meghan Bustamante Clinical Staff  Subject: Referral     Good Morning. .Yes I understand you referred Dr Marion Escobedo to [facility] he doesn't have an opening until July 12th. Nay Meza can see me as soon as tomorrow. I need to be seen as soon as possible to f f diagnosis my problem and start treatment. I would greatly appreciate the referral sent to him.   Thank you  Shalom Sexton

## 2022-06-28 NOTE — TELEPHONE ENCOUNTER
Zaynab Peng 6/27/2022 1:57 PM CDT      ----- Message -----  From: Aron Jeffery  Sent: 6/27/2022 1:12 PM CDT  To: Meghan Bustamante Clinical Staff  Subject: Referral     Good afternoon. I need to get a referral for a dermatologist also if you have any good dermatologists you could recommend I would appreciate it.   Thank you, Alia Lyons

## 2022-06-30 ENCOUNTER — PATIENT MESSAGE (OUTPATIENT)
Dept: INTERNAL MEDICINE CLINIC | Facility: CLINIC | Age: 62
End: 2022-06-30

## 2022-06-30 DIAGNOSIS — E03.9 HYPOTHYROIDISM, UNSPECIFIED TYPE: Primary | ICD-10-CM

## 2022-07-01 RX ORDER — LEVOTHYROXINE SODIUM 0.03 MG/1
25 TABLET ORAL
Qty: 90 TABLET | Refills: 0 | Status: SHIPPED | OUTPATIENT
Start: 2022-07-01

## 2022-07-10 RX ORDER — MONTELUKAST SODIUM 10 MG/1
10 TABLET ORAL EVERY EVENING
Qty: 90 TABLET | Refills: 3 | Status: SHIPPED | OUTPATIENT
Start: 2022-07-10 | End: 2023-01-16

## 2022-09-01 RX ORDER — ATORVASTATIN CALCIUM 10 MG/1
10 TABLET, FILM COATED ORAL DAILY
Qty: 90 TABLET | Refills: 0 | Status: SHIPPED | OUTPATIENT
Start: 2022-09-01 | End: 2022-11-28

## 2022-09-02 DIAGNOSIS — Z00.00 ANNUAL PHYSICAL EXAM: ICD-10-CM

## 2022-09-02 DIAGNOSIS — Z12.11 SCREENING FOR COLON CANCER: ICD-10-CM

## 2022-09-02 RX ORDER — LISINOPRIL 5 MG/1
TABLET ORAL
Qty: 90 TABLET | Refills: 0 | Status: SHIPPED | OUTPATIENT
Start: 2022-09-02

## 2022-09-30 DIAGNOSIS — E03.9 HYPOTHYROIDISM, UNSPECIFIED TYPE: ICD-10-CM

## 2022-10-02 RX ORDER — LEVOTHYROXINE SODIUM 0.03 MG/1
TABLET ORAL
Qty: 90 TABLET | Refills: 0 | Status: SHIPPED | OUTPATIENT
Start: 2022-10-02

## 2022-11-28 RX ORDER — ATORVASTATIN CALCIUM 10 MG/1
TABLET, FILM COATED ORAL
Qty: 90 TABLET | Refills: 0 | Status: SHIPPED | OUTPATIENT
Start: 2022-11-28

## 2022-12-09 DIAGNOSIS — Z12.11 SCREENING FOR COLON CANCER: ICD-10-CM

## 2022-12-09 DIAGNOSIS — Z00.00 ANNUAL PHYSICAL EXAM: ICD-10-CM

## 2022-12-09 RX ORDER — LISINOPRIL 5 MG/1
TABLET ORAL
Qty: 90 TABLET | Refills: 0 | Status: SHIPPED | OUTPATIENT
Start: 2022-12-09

## 2023-01-04 RX ORDER — LEVOTHYROXINE SODIUM 0.2 MG/1
TABLET ORAL
Qty: 90 TABLET | Refills: 1 | Status: SHIPPED | OUTPATIENT
Start: 2023-01-04

## 2023-01-16 DIAGNOSIS — Z00.00 ANNUAL PHYSICAL EXAM: ICD-10-CM

## 2023-01-16 DIAGNOSIS — Z12.11 SCREENING FOR COLON CANCER: ICD-10-CM

## 2023-01-16 RX ORDER — LEVOTHYROXINE SODIUM 0.2 MG/1
200 TABLET ORAL
Qty: 90 TABLET | Refills: 1 | Status: SHIPPED | OUTPATIENT
Start: 2023-01-16

## 2023-01-16 RX ORDER — LISINOPRIL 5 MG/1
5 TABLET ORAL DAILY
Qty: 90 TABLET | Refills: 1 | Status: SHIPPED | OUTPATIENT
Start: 2023-01-16

## 2023-01-16 RX ORDER — ATORVASTATIN CALCIUM 10 MG/1
10 TABLET, FILM COATED ORAL DAILY
Qty: 90 TABLET | Refills: 1 | Status: SHIPPED | OUTPATIENT
Start: 2023-01-16

## 2023-01-16 RX ORDER — MONTELUKAST SODIUM 10 MG/1
10 TABLET ORAL EVERY EVENING
Qty: 90 TABLET | Refills: 3 | Status: SHIPPED | OUTPATIENT
Start: 2023-01-16

## 2023-01-16 NOTE — TELEPHONE ENCOUNTER
Patients  is calling in requesting a refill, please send to [facility]    Requesting 90 day supply of Levothyroxine (200 and 25), Montelukast, Atorvastatin,Lisinopril.

## 2023-01-24 ENCOUNTER — TELEPHONE (OUTPATIENT)
Dept: INTERNAL MEDICINE CLINIC | Facility: CLINIC | Age: 63
End: 2023-01-24

## 2023-01-24 NOTE — TELEPHONE ENCOUNTER
Patient's  called     Would like us to call the pharmacy so they can confirm she is taking Levothyroxine 200 mcg & 25 mcg

## 2023-04-18 ENCOUNTER — OFFICE VISIT (OUTPATIENT)
Dept: INTERNAL MEDICINE CLINIC | Facility: CLINIC | Age: 63
End: 2023-04-18
Payer: COMMERCIAL

## 2023-04-18 VITALS
HEIGHT: 67 IN | OXYGEN SATURATION: 100 % | BODY MASS INDEX: 19.81 KG/M2 | DIASTOLIC BLOOD PRESSURE: 80 MMHG | HEART RATE: 72 BPM | WEIGHT: 126.19 LBS | SYSTOLIC BLOOD PRESSURE: 120 MMHG

## 2023-04-18 DIAGNOSIS — J45.20 MILD INTERMITTENT ASTHMA WITHOUT COMPLICATION: ICD-10-CM

## 2023-04-18 DIAGNOSIS — Z87.891 HISTORY OF SMOKING: ICD-10-CM

## 2023-04-18 DIAGNOSIS — I25.10 CORONARY ARTERY CALCIFICATION: ICD-10-CM

## 2023-04-18 DIAGNOSIS — Z80.1 FAMILY HISTORY OF LUNG CANCER: ICD-10-CM

## 2023-04-18 DIAGNOSIS — I10 ESSENTIAL HYPERTENSION: ICD-10-CM

## 2023-04-18 DIAGNOSIS — Z78.0 ASYMPTOMATIC MENOPAUSAL STATE: ICD-10-CM

## 2023-04-18 DIAGNOSIS — I25.84 CORONARY ARTERY CALCIFICATION: ICD-10-CM

## 2023-04-18 DIAGNOSIS — M25.552 PAIN OF LEFT HIP: ICD-10-CM

## 2023-04-18 DIAGNOSIS — R91.1 LUNG NODULE: ICD-10-CM

## 2023-04-18 DIAGNOSIS — R93.1 ELEVATED CORONARY ARTERY CALCIUM SCORE: ICD-10-CM

## 2023-04-18 DIAGNOSIS — Z12.83 SCREENING FOR SKIN CANCER: ICD-10-CM

## 2023-04-18 DIAGNOSIS — E03.9 HYPOTHYROIDISM, UNSPECIFIED TYPE: ICD-10-CM

## 2023-04-18 DIAGNOSIS — Z12.31 ENCOUNTER FOR SCREENING MAMMOGRAM FOR MALIGNANT NEOPLASM OF BREAST: ICD-10-CM

## 2023-04-18 DIAGNOSIS — Z00.00 ANNUAL PHYSICAL EXAM: Primary | ICD-10-CM

## 2023-04-18 PROCEDURE — 3079F DIAST BP 80-89 MM HG: CPT | Performed by: INTERNAL MEDICINE

## 2023-04-18 PROCEDURE — 3074F SYST BP LT 130 MM HG: CPT | Performed by: INTERNAL MEDICINE

## 2023-04-18 PROCEDURE — 99396 PREV VISIT EST AGE 40-64: CPT | Performed by: INTERNAL MEDICINE

## 2023-04-18 PROCEDURE — 3008F BODY MASS INDEX DOCD: CPT | Performed by: INTERNAL MEDICINE

## 2023-04-18 RX ORDER — MELOXICAM 15 MG/1
15 TABLET ORAL DAILY
Qty: 7 TABLET | Refills: 0 | Status: SHIPPED | OUTPATIENT
Start: 2023-04-18 | End: 2023-04-25

## 2023-05-03 DIAGNOSIS — E03.9 HYPOTHYROIDISM, UNSPECIFIED TYPE: Primary | ICD-10-CM

## 2023-05-03 LAB
ABSOLUTE BASOPHILS: 17 CELLS/UL (ref 0–200)
ABSOLUTE EOSINOPHILS: 232 CELLS/UL (ref 15–500)
ABSOLUTE LYMPHOCYTES: 2253 CELLS/UL (ref 850–3900)
ABSOLUTE MONOCYTES: 628 CELLS/UL (ref 200–950)
ABSOLUTE NEUTROPHILS: 5470 CELLS/UL (ref 1500–7800)
ALBUMIN/GLOBULIN RATIO: 1.7 (CALC) (ref 1–2.5)
ALBUMIN: 4.3 G/DL (ref 3.6–5.1)
ALKALINE PHOSPHATASE: 67 U/L (ref 37–153)
ALT: 16 U/L (ref 6–29)
AST: 15 U/L (ref 10–35)
BASOPHILS: 0.2 %
BILIRUBIN, TOTAL: 0.6 MG/DL (ref 0.2–1.2)
BUN: 14 MG/DL (ref 7–25)
CALCIUM: 9.9 MG/DL (ref 8.6–10.4)
CALCIUM: 9.9 MG/DL (ref 8.6–10.4)
CARBON DIOXIDE: 28 MMOL/L (ref 20–32)
CHLORIDE: 102 MMOL/L (ref 98–110)
CHOL/HDLC RATIO: 2.3 (CALC)
CHOLESTEROL, TOTAL: 156 MG/DL
CREATININE: 0.69 MG/DL (ref 0.5–1.05)
EGFR: 97 ML/MIN/1.73M2
EOSINOPHILS: 2.7 %
GLOBULIN: 2.6 G/DL (CALC) (ref 1.9–3.7)
GLUCOSE: 75 MG/DL (ref 65–99)
HDL CHOLESTEROL: 68 MG/DL
HEMATOCRIT: 39.8 % (ref 35–45)
HEMOGLOBIN: 13.6 G/DL (ref 11.7–15.5)
LDL-CHOLESTEROL: 72 MG/DL (CALC)
LYMPHOCYTES: 26.2 %
MCH: 30.9 PG (ref 27–33)
MCHC: 34.2 G/DL (ref 32–36)
MCV: 90.5 FL (ref 80–100)
MONOCYTES: 7.3 %
MPV: 10.1 FL (ref 7.5–12.5)
NEUTROPHILS: 63.6 %
NON-HDL CHOLESTEROL: 88 MG/DL (CALC)
PARATHYROID HORMONE,$INTACT: 35 PG/ML (ref 16–77)
PLATELET COUNT: 324 THOUSAND/UL (ref 140–400)
POTASSIUM: 4.8 MMOL/L (ref 3.5–5.3)
PROTEIN, TOTAL: 6.9 G/DL (ref 6.1–8.1)
RDW: 11.6 % (ref 11–15)
RED BLOOD CELL COUNT: 4.4 MILLION/UL (ref 3.8–5.1)
SODIUM: 137 MMOL/L (ref 135–146)
T4, FREE: 1 NG/DL (ref 0.8–1.8)
TRIGLYCERIDES: 82 MG/DL
TSH W/REFLEX TO FT4: 0.03 MIU/L (ref 0.4–4.5)
WHITE BLOOD CELL COUNT: 8.6 THOUSAND/UL (ref 3.8–10.8)

## 2023-06-05 ENCOUNTER — PATIENT MESSAGE (OUTPATIENT)
Dept: INTERNAL MEDICINE CLINIC | Facility: CLINIC | Age: 63
End: 2023-06-05

## 2023-06-05 DIAGNOSIS — Z12.39 SCREENING FOR BREAST CANCER: ICD-10-CM

## 2023-06-05 DIAGNOSIS — J45.20 MILD INTERMITTENT ASTHMA WITHOUT COMPLICATION: ICD-10-CM

## 2023-06-05 DIAGNOSIS — R91.1 LUNG NODULE: ICD-10-CM

## 2023-06-05 DIAGNOSIS — E03.9 HYPOTHYROIDISM, UNSPECIFIED TYPE: ICD-10-CM

## 2023-06-05 DIAGNOSIS — I10 ESSENTIAL HYPERTENSION: ICD-10-CM

## 2023-06-05 DIAGNOSIS — Z12.11 SCREENING FOR COLON CANCER: ICD-10-CM

## 2023-06-06 RX ORDER — BUDESONIDE AND FORMOTEROL FUMARATE DIHYDRATE 80; 4.5 UG/1; UG/1
2 AEROSOL RESPIRATORY (INHALATION) 2 TIMES DAILY
Qty: 3 EACH | Refills: 3 | Status: SHIPPED | OUTPATIENT
Start: 2023-06-06

## 2023-06-06 NOTE — TELEPHONE ENCOUNTER
Nathanael Zepeda, [facility] [place] 6/5/2023 2:49 PM CDT    Can you enter prescription so I can fax it     ----- Message -----  From: Kaylene Anderson: 6/5/2023 10:27 AM CDT  To: Meghan Bustamante Clinical Staff  Subject: Inhaler     I am having my budesonide inhaler refilled at the following pharmacy. They should contact the office for a script. But you could just fax the script to them. (Preferred). It is for 360 doses. In [facility]) 100/6 is the same as [place] 80-4.5  A. S.A.P. It takes 4 weeks to arrive here! [email or web address]  Phone [phone]  Fax [phone]  Email- [email or web address]

## 2023-06-17 DIAGNOSIS — Z12.11 SCREENING FOR COLON CANCER: ICD-10-CM

## 2023-06-17 DIAGNOSIS — Z00.00 ANNUAL PHYSICAL EXAM: ICD-10-CM

## 2023-06-19 RX ORDER — ATORVASTATIN CALCIUM 10 MG/1
TABLET, FILM COATED ORAL
Qty: 90 TABLET | Refills: 1 | Status: SHIPPED | OUTPATIENT
Start: 2023-06-19

## 2023-06-19 RX ORDER — LISINOPRIL 5 MG/1
TABLET ORAL
Qty: 90 TABLET | Refills: 1 | Status: SHIPPED | OUTPATIENT
Start: 2023-06-19

## 2023-09-11 ENCOUNTER — LAB ENCOUNTER (OUTPATIENT)
Dept: LAB | Age: 63
End: 2023-09-11
Attending: INTERNAL MEDICINE
Payer: MEDICARE

## 2023-09-11 DIAGNOSIS — E03.9 HYPOTHYROIDISM, UNSPECIFIED TYPE: Primary | ICD-10-CM

## 2023-09-11 LAB
T3FREE SERPL-MCNC: 2.49 PG/ML (ref 2.4–4.2)
T4 FREE SERPL-MCNC: 1.3 NG/DL (ref 0.8–1.7)
TSI SER-ACNC: 0.2 MIU/ML (ref 0.36–3.74)

## 2023-09-11 PROCEDURE — 84443 ASSAY THYROID STIM HORMONE: CPT | Performed by: INTERNAL MEDICINE

## 2023-09-11 PROCEDURE — 84481 FREE ASSAY (FT-3): CPT | Performed by: INTERNAL MEDICINE

## 2023-09-11 PROCEDURE — 84439 ASSAY OF FREE THYROXINE: CPT | Performed by: INTERNAL MEDICINE

## 2023-09-11 PROCEDURE — 36415 COLL VENOUS BLD VENIPUNCTURE: CPT | Performed by: INTERNAL MEDICINE

## 2023-09-28 DIAGNOSIS — I25.10 CORONARY ARTERY CALCIFICATION: ICD-10-CM

## 2023-09-28 DIAGNOSIS — M25.559 HIP PAIN, UNSPECIFIED LATERALITY: Primary | ICD-10-CM

## 2023-09-28 DIAGNOSIS — I25.84 CORONARY ARTERY CALCIFICATION: ICD-10-CM

## 2023-10-18 RX ORDER — LEVOTHYROXINE SODIUM 0.2 MG/1
200 TABLET ORAL
Qty: 90 TABLET | Refills: 1 | Status: SHIPPED | OUTPATIENT
Start: 2023-10-18

## 2023-11-27 DIAGNOSIS — Z00.00 ANNUAL PHYSICAL EXAM: ICD-10-CM

## 2023-11-27 DIAGNOSIS — Z12.11 SCREENING FOR COLON CANCER: ICD-10-CM

## 2023-11-27 RX ORDER — MONTELUKAST SODIUM 10 MG/1
10 TABLET ORAL EVERY EVENING
Qty: 90 TABLET | Refills: 1 | Status: SHIPPED | OUTPATIENT
Start: 2023-11-27

## 2023-11-27 RX ORDER — ATORVASTATIN CALCIUM 10 MG/1
10 TABLET, FILM COATED ORAL DAILY
Qty: 90 TABLET | Refills: 0 | Status: SHIPPED | OUTPATIENT
Start: 2023-11-27

## 2023-11-27 RX ORDER — LISINOPRIL 5 MG/1
5 TABLET ORAL DAILY
Qty: 90 TABLET | Refills: 1 | Status: SHIPPED | OUTPATIENT
Start: 2023-11-27

## 2023-11-27 NOTE — TELEPHONE ENCOUNTER
MEDICATION REFILL REQUEST:    Future Appointment: NO FUTURE APPT     Last appointment: 04/18/2023     Medication requested:  Requested Prescriptions     Pending Prescriptions Disp Refills    montelukast 10 MG Oral Tab 90 tablet 0     Sig: Take 1 tablet (10 mg total) by mouth every evening. atorvastatin 10 MG Oral Tab 90 tablet 1     Sig: Take 1 tablet (10 mg total) by mouth daily. lisinopril 5 MG Oral Tab 90 tablet 1     Sig: Take 1 tablet (5 mg total) by mouth daily.          Last refill date:   montelukast 10 MG Oral Tab 90 tablet 3 1/16/2023     ATORVASTATIN 10 MG Oral Tab 90 tablet 1 6/19/2023     LISINOPRIL 5 MG Oral Tab 90 tablet 1 6/19/2023       Protocol Status:

## 2024-03-04 RX ORDER — ATORVASTATIN CALCIUM 10 MG/1
10 TABLET, FILM COATED ORAL DAILY
Qty: 90 TABLET | Refills: 0 | Status: SHIPPED | OUTPATIENT
Start: 2024-03-04

## 2024-03-06 ENCOUNTER — PATIENT MESSAGE (OUTPATIENT)
Dept: INTERNAL MEDICINE CLINIC | Facility: CLINIC | Age: 64
End: 2024-03-06

## 2024-03-07 ENCOUNTER — APPOINTMENT (OUTPATIENT)
Dept: CT IMAGING | Age: 64
End: 2024-03-07
Attending: NURSE PRACTITIONER
Payer: MEDICARE

## 2024-03-07 ENCOUNTER — NURSE TRIAGE (OUTPATIENT)
Dept: INTERNAL MEDICINE CLINIC | Facility: CLINIC | Age: 64
End: 2024-03-07

## 2024-03-07 ENCOUNTER — PATIENT MESSAGE (OUTPATIENT)
Dept: INTERNAL MEDICINE CLINIC | Facility: CLINIC | Age: 64
End: 2024-03-07

## 2024-03-07 ENCOUNTER — HOSPITAL ENCOUNTER (OUTPATIENT)
Age: 64
Discharge: HOME | End: 2024-03-07
Payer: MEDICARE

## 2024-03-07 VITALS
RESPIRATION RATE: 19 BRPM | SYSTOLIC BLOOD PRESSURE: 114 MMHG | DIASTOLIC BLOOD PRESSURE: 55 MMHG | TEMPERATURE: 99 F | HEART RATE: 89 BPM | OXYGEN SATURATION: 100 %

## 2024-03-07 DIAGNOSIS — R19.7 DIARRHEA, UNSPECIFIED TYPE: ICD-10-CM

## 2024-03-07 DIAGNOSIS — J01.20 ACUTE NON-RECURRENT ETHMOIDAL SINUSITIS: ICD-10-CM

## 2024-03-07 DIAGNOSIS — R51.9 FRONTAL HEADACHE: Primary | ICD-10-CM

## 2024-03-07 LAB
#MXD IC: 1.4 X10ˆ3/UL (ref 0.1–1)
BUN BLD-MCNC: 21 MG/DL (ref 7–18)
CHLORIDE BLD-SCNC: 104 MMOL/L (ref 98–112)
CO2 BLD-SCNC: 24 MMOL/L (ref 21–32)
CREAT BLD-MCNC: 0.8 MG/DL
EGFRCR SERPLBLD CKD-EPI 2021: 82 ML/MIN/1.73M2 (ref 60–?)
GLUCOSE BLD-MCNC: 89 MG/DL (ref 70–99)
HCT VFR BLD AUTO: 39.5 %
HCT VFR BLD CALC: 42 %
HGB BLD-MCNC: 13.1 G/DL
ISTAT IONIZED CALCIUM FOR CHEM 8: 1.29 MMOL/L (ref 1.12–1.32)
LYMPHOCYTES # BLD AUTO: 2.3 X10ˆ3/UL (ref 1–4)
LYMPHOCYTES NFR BLD AUTO: 19.1 %
MCH RBC QN AUTO: 30 PG (ref 26–34)
MCHC RBC AUTO-ENTMCNC: 33.2 G/DL (ref 31–37)
MCV RBC AUTO: 90.6 FL (ref 80–100)
MIXED CELL %: 11.4 %
NEUTROPHILS # BLD AUTO: 8.3 X10ˆ3/UL (ref 1.5–7.7)
NEUTROPHILS NFR BLD AUTO: 69.5 %
PLATELET # BLD AUTO: 366 X10ˆ3/UL (ref 150–450)
POTASSIUM BLD-SCNC: 4 MMOL/L (ref 3.6–5.1)
RBC # BLD AUTO: 4.36 X10ˆ6/UL
SODIUM BLD-SCNC: 137 MMOL/L (ref 136–145)
WBC # BLD AUTO: 12 X10ˆ3/UL (ref 4–11)

## 2024-03-07 PROCEDURE — 99204 OFFICE O/P NEW MOD 45 MIN: CPT

## 2024-03-07 PROCEDURE — 85025 COMPLETE CBC W/AUTO DIFF WBC: CPT | Performed by: NURSE PRACTITIONER

## 2024-03-07 PROCEDURE — 70450 CT HEAD/BRAIN W/O DYE: CPT | Performed by: NURSE PRACTITIONER

## 2024-03-07 PROCEDURE — 80047 BASIC METABLC PNL IONIZED CA: CPT

## 2024-03-07 PROCEDURE — 99214 OFFICE O/P EST MOD 30 MIN: CPT

## 2024-03-07 PROCEDURE — 36415 COLL VENOUS BLD VENIPUNCTURE: CPT

## 2024-03-07 RX ORDER — FLUTICASONE PROPIONATE 50 MCG
2 SPRAY, SUSPENSION (ML) NASAL DAILY
Qty: 16 G | Refills: 0 | Status: SHIPPED | OUTPATIENT
Start: 2024-03-07

## 2024-03-07 RX ORDER — DOXYCYCLINE HYCLATE 100 MG/1
100 CAPSULE ORAL 2 TIMES DAILY
Qty: 14 CAPSULE | Refills: 0 | Status: SHIPPED | OUTPATIENT
Start: 2024-03-07 | End: 2024-03-14

## 2024-03-07 RX ORDER — SODIUM CHLORIDE 9 MG/ML
500 INJECTION, SOLUTION INTRAVENOUS ONCE
Status: COMPLETED | OUTPATIENT
Start: 2024-03-07 | End: 2024-03-07

## 2024-03-07 NOTE — ED INITIAL ASSESSMENT (HPI)
Patient with headache x 4 days without head trauma. No vision changes. No dizziness  Patient states pain is worse when she bends forward. Denies sinus pressure  Patient also with 11 days of diarrhea. No abdominal pain, no fevers  States she has diarrhea after every meal and in between meals with one episode of incontinence overnight.

## 2024-03-07 NOTE — TELEPHONE ENCOUNTER
From: May Mir  To: Justin Ivey  Sent: 3/6/2024 7:38 PM CST  Subject: Hi Dr. Ivey    I've noticed on my chart there are no appointments available until July.I really need to see you this week.  I have been dealing with really bad headaches along my forehead area.This is going to go on four days.  Is there any chance of getting an appointment?  Thank You,  May.

## 2024-03-07 NOTE — ED PROVIDER NOTES
Patient Seen in: [facility]      History     Chief Complaint   Patient presents with    Headache     Stated Complaint: major headaches    Subjective:   HPI    63-year-old female here for evaluation of constant intractable frontal headache that started 4 days ago.  She reports pain comes and goes with intensity, Excedrin dulling the pain but returning after.  She denies any vision changes, loss of vision, dizziness, ear pain or tinnitus, chest pain or shortness of breath, cough or cold symptoms.  She denies any falls or head trauma recently, history of migraines or headaches in the past, neck or back pain, numbness tingling to extremities, history of brain or back surgeries.  Patient also reports having diarrhea that started last Monday.  She reports going multiple times throughout the day especially after eating.  She denies any blood in her stools, abdominal pain or cramping, fevers, nausea or vomiting.  She reports having 1 episode of incontinent stool overnight.  She did noticed chills yesterday after a bath and this morning again.  She denies any history of colitis or Crohn's, she is not on any blood thinners, denies any abdominal surgeries in the past.  She has been drinking fluids and eating okay.  Denies loss of appetite.  Reports weight loss of 4 pounds in the last 2 weeks since diarrhea started.    Objective:   Past Medical History:   Diagnosis Date    Asthma (HCC)     Essential hypertension     Hypothyroidism               Past Surgical History:   Procedure Laterality Date    OTHER      removal of ovary                Social History     Socioeconomic History    Marital status:    Tobacco Use    Smoking status: Former     Types: Cigarettes     Quit date: 5/3/2014     Years since quittin.8    Smokeless tobacco: Never   Substance and Sexual Activity    Alcohol use: Yes     Alcohol/week: 0.0 standard drinks of alcohol     Comment: occ    Drug use: No   Other Topics Concern    Caffeine  Concern Yes     Comment: 1-2 cups coffee    Exercise No   Social History Narrative    The patient does not use an assistive device..      The patient does live in a home with stairs.              Review of Systems    Positive for stated complaint: major headaches  Other systems are as noted in HPI.  Constitutional and vital signs reviewed.      All other systems reviewed and negative except as noted above.    Physical Exam     ED Triage Vitals [03/07/24 1508]   /70   Pulse 99   Resp 20   Temp 98.7 °F (37.1 °C)   Temp src Temporal   SpO2 100 %   O2 Device None (Room air)       Current:/55   Pulse 89   Temp 98.7 °F (37.1 °C) (Temporal)   Resp 19   SpO2 100%         Physical Exam  Vitals and nursing note reviewed.   Constitutional:       General: She is not in acute distress.     Appearance: Normal appearance. She is not ill-appearing or toxic-appearing.   HENT:      Head: Normocephalic and atraumatic. No raccoon eyes, Peter's sign, right periorbital erythema or left periorbital erythema.      Jaw: There is normal jaw occlusion.      Right Ear: Tympanic membrane, ear canal and external ear normal. No drainage. No middle ear effusion. No mastoid tenderness. No hemotympanum. Tympanic membrane is not injected or perforated.      Left Ear: Tympanic membrane, ear canal and external ear normal. No drainage.  No middle ear effusion. No mastoid tenderness. No hemotympanum. Tympanic membrane is not injected or perforated.      Nose: Nose normal. No congestion.      Mouth/Throat:      Lips: Pink.      Mouth: Mucous membranes are moist.      Pharynx: Oropharynx is clear. No oropharyngeal exudate or posterior oropharyngeal erythema.   Eyes:      General: Lids are normal. Vision grossly intact. No allergic shiner, visual field deficit or scleral icterus.        Right eye: No foreign body, discharge or hordeolum.         Left eye: No foreign body, discharge or hordeolum.      Extraocular Movements: Extraocular  movements intact.      Right eye: Normal extraocular motion and no nystagmus.      Left eye: Normal extraocular motion and no nystagmus.      Conjunctiva/sclera: Conjunctivae normal.      Right eye: Right conjunctiva is not injected. No chemosis.     Left eye: Left conjunctiva is not injected. No chemosis.     Pupils: Pupils are equal, round, and reactive to light.   Cardiovascular:      Rate and Rhythm: Normal rate.      Pulses: Normal pulses.   Pulmonary:      Effort: Pulmonary effort is normal. No tachypnea or respiratory distress.      Breath sounds: Normal breath sounds and air entry. No stridor, decreased air movement or transmitted upper airway sounds. No decreased breath sounds.   Abdominal:      General: Abdomen is flat. Bowel sounds are normal. There is no distension.      Palpations: Abdomen is soft. There is no mass.      Tenderness: There is no abdominal tenderness. There is no right CVA tenderness, left CVA tenderness, guarding or rebound. Negative signs include Abad's sign and McBurney's sign.   Musculoskeletal:         General: Normal range of motion.      Cervical back: Full passive range of motion without pain, normal range of motion and neck supple. No edema, erythema, rigidity or tenderness. No spinous process tenderness or muscular tenderness.   Skin:     General: Skin is warm.   Neurological:      Mental Status: She is alert and oriented to person, place, and time.      Cranial Nerves: Cranial nerves 2-12 are intact. No cranial nerve deficit, dysarthria or facial asymmetry.      Sensory: Sensation is intact.      Motor: Motor function is intact. No weakness or pronator drift.      Coordination: Coordination is intact. Romberg sign negative.      Gait: Gait is intact.   Psychiatric:         Mood and Affect: Mood normal.         Behavior: Behavior normal.               ED Course     Labs Reviewed   POCT CBC - Abnormal; Notable for the following components:       Result Value    WBC IC 12.0 (*)      # Neutrophil 8.3 (*)     # Mixed Cells 1.4 (*)     All other components within normal limits   POCT ISTAT CHEM8 CARTRIDGE - Abnormal; Notable for the following components:    ISTAT BUN 21 (*)     All other components within normal limits          ED Course as of 03/07/24 1828  ------------------------------------------------------------  Time: 03/07 1557  Value: # Neutrophil(!): 8.3  Comment: (Reviewed)  ------------------------------------------------------------  Time: 03/07 1557  Value: WBC IC(!): 12.0  Comment: (Reviewed)  ------------------------------------------------------------  Time: 03/07 1557  Value: #MXD IC(!): 1.4  Comment: (Reviewed)  ------------------------------------------------------------  Time: 03/07 1557  Value: ISTAT BUN(!): 21  Comment: (Reviewed)  ------------------------------------------------------------  Time: 03/07 1641  Value: CT BRAIN OR HEAD (86703)  Comment: Impression  CONCLUSION:     Mild ethmoid sinusitis.     No acute intracranial abnormality.                MDM     63 yr old female here for intractable frontal headache x 4 days. No dizziness, vision changes, neuro symptoms.  Diarrhea x 11 days. 4 lb weight loss, no abd pain, fever, vomiting.    ON exam, pt well appearing. Lungs clear . No wheezing. BL TM normal. Pharynx clear with no erythema. Full ROM to extremities. Good equal  5/5, no facial droop slurred speech. Sensation equal to BL face, arms, legs. No pronator drift. Pupils perrla intact EOM no nystagmus. Soft, nontender non distended abdomen. No cvat. No masses palpated. No swelling or ecchymosis.    Diff dx: dehydration from diarrhea, viral vs bacterial diarrhea/enteritis vs sinusitis vs mass or shift causing headache  Discussed pt with Dr Gillespie, will obtain labs, give IVF and CT head to r/o mass causing HA.    PT agrees with plan.    CBC with mild leukocytosis 12.0  Chem BUN 21 otherwise unremarkable    CT brain- +ethmoid sinusitis, otherwise no obvious  acute intracranial findings to suggest reason for HA.    Discussed sinusitis, could cause HA too. Will send home on doxycycline x 7 days, advised to continued excedrin, OR ibuprofen/tylenol combo,   Discussed likely viral diarrhea. PO fluids, bland diet, fibrous foods.  Stool testing placed for outpatient lab drop off.    Discussed cultures pending if positive will get a phone call.  Advised on close fu with PCP next week for re-evaluation.  PT stable, nontoxic, agrees with plan of care.  All questions answered. Return and ER precautions given.    Counseled: Patient, regarding diagnosis, regarding treatment plan, regarding diagnostic results, regarding prescription, I have discussed with the patient the results of tests, differential diagnosis, and warning signs and symptoms that should prompt immediate return. The patient understands these instructions and agrees to the follow-up plan provided. There is no barriers to learning. Appropriate f/u given. Patient agrees to return for any concerns/ problems/complications.                                   Medical Decision Making      Disposition and Plan     Clinical Impression:  1. Frontal headache    2. Diarrhea, unspecified type    3. Acute non-recurrent ethmoidal sinusitis         Disposition:  Discharge  3/7/2024  4:45 pm    Follow-up:  Justin Ivey MD  [address] [place] [id number]  [phone]    Schedule an appointment as soon as possible for a visit in 1 week            Medications Prescribed:  Discharge Medication List as of 3/7/2024  4:46 PM        START taking these medications    Details   doxycycline 100 MG Oral Cap Take 1 capsule (100 mg total) by mouth 2 (two) times daily for 7 days., Normal, Disp-14 capsule, R-0      fluticasone propionate 50 MCG/ACT Nasal Suspension 2 sprays by Nasal route daily., Normal, Disp-16 g, R-0

## 2024-03-07 NOTE — TELEPHONE ENCOUNTER
From: May Mir  To: Justin Ivey  Sent: 3/7/2024 12:24 PM CST  Subject: Headaches     Hi [place].I will be going to urgent care this afternoon.I don't see anything in MyChart in regards to our conversation this morning.  Thank you,  May

## 2024-03-07 NOTE — TELEPHONE ENCOUNTER
S/w May has frontal headache x 4 days. Headache reduced from 4 to 2/10. Pt denied dizziness, visual changes, nausea/vomiting, or numbness/weakness.     Pt has diarrhea x 11 days. Diarrhea 5-8 liquid stools a day. Pt has moist mucous membranes, last void 30 minutes ago clear yellow.     Disposition: Today or tomorrow    No appts available. Pt will go to  for evaluation and treatment. Pt informed if have 10/10 head pain or develop dizziness,visual changes, vision changes, or numbness/weakness divert to the ED. Pt voiced understanding.  Reason for Disposition   Unexplained headache that is present > 24 hours    Answer Assessment - Initial Assessment Questions  1. LOCATION: \"Where does it hurt?\"       Frontal  2. ONSET: \"When did the headache start?\" (Minutes, hours or days)       4 days  3. PATTERN: \"Does the pain come and go, or has it been constant since it started?\"      Constant  4. SEVERITY: \"How bad is the pain?\" and \"What does it keep you from doing?\"  (e.g., Scale 1-10; mild, moderate, or severe)    - MILD (1-3): doesn't interfere with normal activities     - MODERATE (4-7): interferes with normal activities or awakens from sleep     - SEVERE (8-10): excruciating pain, unable to do any normal activities         4.5/10 after Excedrin 2/10  5. RECURRENT SYMPTOM: \"Have you ever had headaches before?\" If Yes, ask: \"When was the last time?\" and \"What happened that time?\"       Denied  6. CAUSE: \"What do you think is causing the headache?\"      Denied  7. MIGRAINE: \"Have you been diagnosed with migraine headaches?\" If Yes, ask: \"Is this headache similar?\"       Denied  8. HEAD INJURY: \"Has there been any recent injury to the head?\"       Denied  9. OTHER SYMPTOMS: \"Do you have any other symptoms?\" (fever, stiff neck, eye pain, sore throat, cold symptoms)      Diarrhea for 11 days.  10. PREGNANCY: \"Is there any chance you are pregnant?\" \"When was your last menstrual period?\"        N/A    Protocols used:  Headache-A-[place]

## 2024-03-07 NOTE — DISCHARGE INSTRUCTIONS
Follow up with your doctor in the next week for re-evaluation.    Take Doxycycline two times a day for 7 days.  Increase water intake, electrolyte drinks to help with hydration. Take antibiotic with food.  Use flonase daily to help with sinus drainage, congestion and headaches.  Continue Excedrin OR tylenol/motrin alternating for headaches    IF you obtain stool sample, bring back to lab here and they will process it. It takes usually 5-7 days for it to come back. If any positive findings, you will get a phone call for treatment.    Continue with fluid hydration including water, Gatorade or Pedialyte.  As you are able to tolerate fluids, increase to a bland diet.  Avoid any spicy, greasy or acidic foods like tomato based sauces or orange juices.  Stick with small meals throughout the day.  Fibrous foods will help solidify stools, apples, bananas, greens. Bowel rest, no alcohol caffeine or dairy.      Do not take any antidiarrheals as this will keep what ever is causing the diarrhea in you.  Continue to move bowels as normal.    Go to ER for worsening symptoms like worsening abdominal pain, fevers greater than 101, not able to keep any fluids down, dizziness and chest pain.

## 2024-03-08 ENCOUNTER — LAB ENCOUNTER (OUTPATIENT)
Dept: LAB | Age: 64
End: 2024-03-08
Attending: INTERNAL MEDICINE
Payer: MEDICARE

## 2024-03-08 LAB
CRYPTOSP AG STL QL IA: NEGATIVE
G LAMBLIA AG STL QL IA: NEGATIVE

## 2024-03-08 PROCEDURE — 87077 CULTURE AEROBIC IDENTIFY: CPT | Performed by: NURSE PRACTITIONER

## 2024-03-08 PROCEDURE — 87046 STOOL CULTR AEROBIC BACT EA: CPT | Performed by: NURSE PRACTITIONER

## 2024-03-08 PROCEDURE — 87045 FECES CULTURE AEROBIC BACT: CPT | Performed by: NURSE PRACTITIONER

## 2024-03-08 PROCEDURE — 87427 SHIGA-LIKE TOXIN AG IA: CPT | Performed by: NURSE PRACTITIONER

## 2024-03-08 PROCEDURE — 87272 CRYPTOSPORIDIUM AG IF: CPT | Performed by: NURSE PRACTITIONER

## 2024-03-08 PROCEDURE — 87329 GIARDIA AG IA: CPT | Performed by: NURSE PRACTITIONER

## 2024-04-22 NOTE — TELEPHONE ENCOUNTER
Future Appointment:None      Last Appointment:4/18/23      Last Refill:10/18/23      Medication Requested:   Requested Prescriptions     Pending Prescriptions Disp Refills    LEVOTHYROXINE 200 MCG Oral Tab [Pharmacy Med Name: Levothyroxine Sodium 200 Mcg Tab Lupi] 90 tablet 0     Sig: TAKE ONE TABLET BY MOUTH DAILY BEFORE BREAKFAST       Protocol :       Thyroid Medication Protocol [id number]/2024 08:07 AM   Protocol Details Last TSH value is normal    In person appointment or virtual visit in the past 12 mos or appointment in next 3 mos    TSH in past 12 months        
normal pacemaker function   sensing and pacing well   good impedance   thresholds measured via iterative testing  no events  no reprogramming required  report in chart
Critically ill pt requiring PIV access for medical management and/or pressor support. Under US guidance identified Right UA vein, and successfully placed 20g x 5.7cm accucath extend dwell angiocath into vessel. Placement confirmed s/p with ultrasound and catheter determined to be in patent lumen of vein. Pt tolerated well w/o complication.
Initial (On Arrival)

## 2024-04-23 ENCOUNTER — TELEPHONE (OUTPATIENT)
Dept: INTERNAL MEDICINE CLINIC | Facility: CLINIC | Age: 64
End: 2024-04-23

## 2024-04-23 RX ORDER — LEVOTHYROXINE SODIUM 0.2 MG/1
200 TABLET ORAL
Qty: 90 TABLET | Refills: 0 | Status: SHIPPED | OUTPATIENT
Start: 2024-04-23

## 2024-05-29 ENCOUNTER — OFFICE VISIT (OUTPATIENT)
Dept: INTERNAL MEDICINE CLINIC | Facility: CLINIC | Age: 64
End: 2024-05-29

## 2024-05-29 VITALS
BODY MASS INDEX: 20.03 KG/M2 | DIASTOLIC BLOOD PRESSURE: 66 MMHG | WEIGHT: 127.63 LBS | HEIGHT: 67 IN | OXYGEN SATURATION: 100 % | SYSTOLIC BLOOD PRESSURE: 102 MMHG | HEART RATE: 69 BPM

## 2024-05-29 DIAGNOSIS — J45.20 MILD INTERMITTENT ASTHMA WITHOUT COMPLICATION (HCC): ICD-10-CM

## 2024-05-29 DIAGNOSIS — R91.8 LUNG NODULES: ICD-10-CM

## 2024-05-29 DIAGNOSIS — Z78.0 ASYMPTOMATIC MENOPAUSAL STATE: ICD-10-CM

## 2024-05-29 DIAGNOSIS — I10 ESSENTIAL HYPERTENSION: ICD-10-CM

## 2024-05-29 DIAGNOSIS — E55.9 VITAMIN D DEFICIENCY: ICD-10-CM

## 2024-05-29 DIAGNOSIS — R19.7 DIARRHEA, UNSPECIFIED TYPE: ICD-10-CM

## 2024-05-29 DIAGNOSIS — M16.12 OSTEOARTHRITIS OF LEFT HIP, UNSPECIFIED OSTEOARTHRITIS TYPE: ICD-10-CM

## 2024-05-29 DIAGNOSIS — I25.84 CORONARY ARTERY CALCIFICATION: ICD-10-CM

## 2024-05-29 DIAGNOSIS — Z13.820 SCREENING FOR OSTEOPOROSIS: ICD-10-CM

## 2024-05-29 DIAGNOSIS — M25.552 PAIN OF LEFT HIP: ICD-10-CM

## 2024-05-29 DIAGNOSIS — Z87.891 HISTORY OF SMOKING: ICD-10-CM

## 2024-05-29 DIAGNOSIS — Z12.11 SCREENING FOR COLON CANCER: ICD-10-CM

## 2024-05-29 DIAGNOSIS — M47.812 OSTEOARTHRITIS OF CERVICAL SPINE, UNSPECIFIED SPINAL OSTEOARTHRITIS COMPLICATION STATUS: ICD-10-CM

## 2024-05-29 DIAGNOSIS — J43.9 PULMONARY EMPHYSEMA, UNSPECIFIED EMPHYSEMA TYPE (HCC): ICD-10-CM

## 2024-05-29 DIAGNOSIS — R93.1 ELEVATED CORONARY ARTERY CALCIUM SCORE: ICD-10-CM

## 2024-05-29 DIAGNOSIS — I25.10 ATHEROSCLEROSIS OF CORONARY ARTERY OF NATIVE HEART WITHOUT ANGINA PECTORIS, UNSPECIFIED VESSEL OR LESION TYPE: ICD-10-CM

## 2024-05-29 DIAGNOSIS — E03.9 HYPOTHYROIDISM, UNSPECIFIED TYPE: ICD-10-CM

## 2024-05-29 DIAGNOSIS — Z00.00 WELCOME TO MEDICARE PREVENTIVE VISIT: Primary | ICD-10-CM

## 2024-05-29 DIAGNOSIS — Z78.0 ASYMPTOMATIC MENOPAUSE: ICD-10-CM

## 2024-05-29 DIAGNOSIS — Z12.83 SCREENING FOR SKIN CANCER: ICD-10-CM

## 2024-05-29 DIAGNOSIS — Z12.31 ENCOUNTER FOR SCREENING MAMMOGRAM FOR MALIGNANT NEOPLASM OF BREAST: ICD-10-CM

## 2024-05-29 DIAGNOSIS — Z80.1 FAMILY HISTORY OF LUNG CANCER: ICD-10-CM

## 2024-05-29 DIAGNOSIS — I34.0 MITRAL VALVE INSUFFICIENCY, UNSPECIFIED ETIOLOGY: ICD-10-CM

## 2024-05-29 DIAGNOSIS — I25.10 CORONARY ARTERY CALCIFICATION: ICD-10-CM

## 2024-05-29 LAB
ALBUMIN SERPL-MCNC: 4.5 G/DL (ref 3.2–4.8)
ALBUMIN/GLOB SERPL: 1.4 {RATIO} (ref 1–2)
ALP LIVER SERPL-CCNC: 86 U/L
ALT SERPL-CCNC: 26 U/L
ANION GAP SERPL CALC-SCNC: 6 MMOL/L (ref 0–18)
AST SERPL-CCNC: 24 U/L (ref ?–34)
BASOPHILS # BLD AUTO: 0.05 X10(3) UL (ref 0–0.2)
BASOPHILS NFR BLD AUTO: 0.6 %
BILIRUB SERPL-MCNC: 0.5 MG/DL (ref 0.2–1.1)
BUN BLD-MCNC: 16 MG/DL (ref 9–23)
BUN/CREAT SERPL: 20.5 (ref 10–20)
CALCIUM BLD-MCNC: 10.3 MG/DL (ref 8.7–10.4)
CHLORIDE SERPL-SCNC: 108 MMOL/L (ref 98–112)
CHOLEST SERPL-MCNC: 164 MG/DL (ref ?–200)
CO2 SERPL-SCNC: 27 MMOL/L (ref 21–32)
CREAT BLD-MCNC: 0.78 MG/DL
CRP SERPL-MCNC: <0.4 MG/DL (ref ?–1)
DEPRECATED RDW RBC AUTO: 44.5 FL (ref 35.1–46.3)
EGFRCR SERPLBLD CKD-EPI 2021: 84 ML/MIN/1.73M2 (ref 60–?)
EOSINOPHIL # BLD AUTO: 0.52 X10(3) UL (ref 0–0.7)
EOSINOPHIL NFR BLD AUTO: 5.9 %
ERYTHROCYTE [DISTWIDTH] IN BLOOD BY AUTOMATED COUNT: 12.5 % (ref 11–15)
ERYTHROCYTE [SEDIMENTATION RATE] IN BLOOD: 20 MM/HR
FASTING PATIENT LIPID ANSWER: YES
FASTING STATUS PATIENT QL REPORTED: YES
GLOBULIN PLAS-MCNC: 3.2 G/DL (ref 2–3.5)
GLUCOSE BLD-MCNC: 80 MG/DL (ref 70–99)
HCT VFR BLD AUTO: 44.3 %
HDLC SERPL-MCNC: 77 MG/DL (ref 40–59)
HGB BLD-MCNC: 14.2 G/DL
IGA SERPL-MCNC: 220.8 MG/DL (ref 70–312)
IMM GRANULOCYTES # BLD AUTO: 0.02 X10(3) UL (ref 0–1)
IMM GRANULOCYTES NFR BLD: 0.2 %
LDLC SERPL CALC-MCNC: 69 MG/DL (ref ?–100)
LYMPHOCYTES # BLD AUTO: 2.23 X10(3) UL (ref 1–4)
LYMPHOCYTES NFR BLD AUTO: 25.5 %
MCH RBC QN AUTO: 30.9 PG (ref 26–34)
MCHC RBC AUTO-ENTMCNC: 32.1 G/DL (ref 31–37)
MCV RBC AUTO: 96.5 FL
MONOCYTES # BLD AUTO: 0.88 X10(3) UL (ref 0.1–1)
MONOCYTES NFR BLD AUTO: 10.1 %
NEUTROPHILS # BLD AUTO: 5.05 X10 (3) UL (ref 1.5–7.7)
NEUTROPHILS # BLD AUTO: 5.05 X10(3) UL (ref 1.5–7.7)
NEUTROPHILS NFR BLD AUTO: 57.7 %
NONHDLC SERPL-MCNC: 87 MG/DL (ref ?–130)
OSMOLALITY SERPL CALC.SUM OF ELEC: 292 MOSM/KG (ref 275–295)
PLATELET # BLD AUTO: 380 10(3)UL (ref 150–450)
POTASSIUM SERPL-SCNC: 4.7 MMOL/L (ref 3.5–5.1)
PROT SERPL-MCNC: 7.7 G/DL (ref 5.7–8.2)
RBC # BLD AUTO: 4.59 X10(6)UL
SODIUM SERPL-SCNC: 141 MMOL/L (ref 136–145)
T4 FREE SERPL-MCNC: 2.7 NG/DL (ref 0.8–1.7)
TRIGL SERPL-MCNC: 100 MG/DL (ref 30–149)
TSI SER-ACNC: <0.008 MIU/ML (ref 0.55–4.78)
VIT D+METAB SERPL-MCNC: 39.5 NG/ML (ref 30–100)
VLDLC SERPL CALC-MCNC: 15 MG/DL (ref 0–30)
WBC # BLD AUTO: 8.8 X10(3) UL (ref 4–11)

## 2024-05-29 PROCEDURE — 82306 VITAMIN D 25 HYDROXY: CPT | Performed by: INTERNAL MEDICINE

## 2024-05-29 PROCEDURE — 85652 RBC SED RATE AUTOMATED: CPT | Performed by: INTERNAL MEDICINE

## 2024-05-29 PROCEDURE — 84443 ASSAY THYROID STIM HORMONE: CPT | Performed by: INTERNAL MEDICINE

## 2024-05-29 PROCEDURE — 86140 C-REACTIVE PROTEIN: CPT | Performed by: INTERNAL MEDICINE

## 2024-05-29 PROCEDURE — 80053 COMPREHEN METABOLIC PANEL: CPT | Performed by: INTERNAL MEDICINE

## 2024-05-29 PROCEDURE — 84439 ASSAY OF FREE THYROXINE: CPT | Performed by: INTERNAL MEDICINE

## 2024-05-29 PROCEDURE — 99214 OFFICE O/P EST MOD 30 MIN: CPT | Performed by: INTERNAL MEDICINE

## 2024-05-29 PROCEDURE — 80061 LIPID PANEL: CPT | Performed by: INTERNAL MEDICINE

## 2024-05-29 PROCEDURE — 82784 ASSAY IGA/IGD/IGG/IGM EACH: CPT | Performed by: INTERNAL MEDICINE

## 2024-05-29 PROCEDURE — G0402 INITIAL PREVENTIVE EXAM: HCPCS | Performed by: INTERNAL MEDICINE

## 2024-05-29 PROCEDURE — 86364 TISS TRNSGLTMNASE EA IG CLAS: CPT | Performed by: INTERNAL MEDICINE

## 2024-05-29 PROCEDURE — 85025 COMPLETE CBC W/AUTO DIFF WBC: CPT | Performed by: INTERNAL MEDICINE

## 2024-05-29 NOTE — PROGRESS NOTES
Subjective:   May Mir is a 63 year old female who presents for a Medicare Initial Preventative Physical Exam (Welcome to Medicare- < 12 months on Medicare) and scheduled follow up of multiple significant but stable problems and acute uncomplicated problem. Diarrhea       No chest pain no sob no abdominal pain  No constipation   No fever or chills   No urinary complaints        Diarrhea on and off for close to a year now   Was at  few months ago   When she was at the  had diarrhea 12-13 times per day   Didn't see GI for that   Diarrhea 4-5 times per day now   No blood in the stool   Not at night   No abdominal pain         No smoking  Quit 10 years ago   Smoked since she was in high school   Alcohol : little bit   Exercise : mostly stretching and now cardio      Family history of cancer : mom bile duct cancer   Dad : lung cancer       History/Other:   Fall Risk Assessment:   She has been screened for Falls and is low risk.      Cognitive Assessment:   She had a completely normal cognitive assessment - see flowsheet entries     Functional Ability/Status:   May Mir has a completely normal functional assessment. See flowsheet for details.      Depression Screening (PHQ-2/PHQ-9): PHQ-2 SCORE: 0  , done 5/29/2024          Advanced Directives:   She does NOT have a Living Will. [Do you have a living will?: No]  She does NOT have a Power of  for Health Care. [Do you have a healthcare power of ?: No]  Doesn't have       Patient Active Problem List   Diagnosis    Mild intermittent asthma without complication (HCC)    Essential hypertension    Osteoarthritis of cervical spine    Hypothyroidism    Mitral valve insufficiency    Pulmonary emphysema (HCC)    Lung nodules    History of smoking    Encounter for screening for malignant neoplasm of breast    Screening for osteoporosis    Atherosclerosis of coronary artery of native heart without angina pectoris    Screening for skin cancer     Elevated coronary artery calcium score    Screening for colon cancer    Annual physical exam    Family history of lung cancer    Pain of left hip    Coronary artery calcification    Asymptomatic menopausal state    Vitamin D deficiency     Allergies:  She is allergic to tomato; uncaria tomentosa, cats claw; and dust mite extract.    Current Medications:  No outpatient medications have been marked as taking for the 5/29/24 encounter (Office Visit) with Justin Ivey MD.       Medical History:  She  has a past medical history of Asthma (HCC), Essential hypertension, and Hypothyroidism.  Surgical History:  She  has a past surgical history that includes other.   Family History:  Her family history includes Cancer in her father and mother; Hypertension in her sister.  Social History:  She  reports that she quit smoking about 10 years ago. Her smoking use included cigarettes. She has never used smokeless tobacco. She reports current alcohol use. She reports that she does not use drugs.    Tobacco:  She smoked tobacco in the past but quit greater than 12 months ago.  Social History     Tobacco Use   Smoking Status Former    Current packs/day: 0.00    Types: Cigarettes    Quit date: 5/3/2014    Years since quitting: 10.0   Smokeless Tobacco Never        CAGE Alcohol Screen:   CAGE screening score of 0 on 5/29/2024, showing low risk of alcohol abuse.      Patient Care Team:  Justin Ivey MD as PCP - General (Internal Medicine)  BeharGurpreet MD (Physical Medicine)    Review of Systems  A comprehensive 10 point review of systems was completed.  Pertinent positives and negatives noted in the the HPI.     Objective:   Physical Exam  GENERAL: well developed, well nourished,in no apparent distress  SKIN: no rashes,no suspicious lesions  HEENT: atraumatic, normocephalic,throat are clear  EYES:Conjunctiva are clear  NECK: supple,no adenopathy  BREAST: no dominant or suspicious mass, no nipple discharge  LUNGS: clear to  auscultation  CARDIO: RRR without murmur  GI: good BS's,no masses, HSM or tenderness  EXTREMITIES: no edema       /66   Pulse 69   Ht 5' 7\" (1.702 m)   Wt 127 lb 9.6 oz (57.9 kg)   SpO2 100%   BMI 19.98 kg/m²  Estimated body mass index is 19.98 kg/m² as calculated from the following:    Height as of this encounter: 5' 7\" (1.702 m).    Weight as of this encounter: 127 lb 9.6 oz (57.9 kg).    Medicare Hearing Assessment:   Hearing Screening    Screening Method: Questionnaire  I have a problem hearing over the telephone: No I have trouble following the conversations when two or more people are talking at the same time: No   I have trouble understanding things on the TV: No I have to strain to understand conversations: No   I have to worry about missing the telephone ring or doorbell: No I have trouble hearing conversations in a noisy background such as a crowded room or restaurant: No   I get confused about where sounds come from: No I misunderstand some words in a sentence and need to ask people to repeat themselves: No   I especially have trouble understanding the speech of women and children: No I have trouble understanding the speaker in a large room such as at a meeting or place of Hindu: No   Many people I talk to seem to mumble (or don't speak clearly): No People get annoyed because I misunderstand what they say: No   I misunderstand what others are saying and make inappropriate responses: No I avoid social activities because I cannot hear well and fear I will reply improperly: No   Family members and friends have told me they think I may have hearing loss: No             Visual Acuity:   Right Eye Visual Acuity: Uncorrected Right Eye Chart Acuity: 20/20   Left Eye Visual Acuity: Uncorrected Left Eye Chart Acuity: 20/20   Both Eyes Visual Acuity: Uncorrected Both Eyes Chart Acuity: 20/20   Able To Tolerate Visual Acuity: Yes        Assessment & Plan:   May Mir is a 63 year old female who  presents for a Medicare Assessment.   1. Osteoarthritis of left hip, unspecified osteoarthritis E/M  Didn't see ortho last year   Advised to have MRI of the hip to evaluate sclerotic focus on previous hip X ray     - [facility] CHRISTIAN 2D+3D SCREENING BILAT (CPT=77067/33736); Future  - MRI HIPS (W+WO), LEFT (CPT=18996); Future  - XR DEXA BONE DENSITOMETRY (CPT=77080); Future  - CBC With Differential With Platelet; Future  - Comp Metabolic Panel (14); Future  - Lipid Panel; Future  - TSH W Reflex To Free T4; Future  - Vitamin D; Future  - Sed Rate, Westergren (Automated); Future  - C-Reactive Protein [E]; Future  - CELIAC DISEASE SCREEN Reflex; Future  - C. diff toxigenic PCR (OPT) [E]; Future  - CT LUNG LD SCREENING(CPT=71271); Future  - Calprotectin, Fecal; Future  - Gastro Referral - In Network  - CARD NUC STRESS TEST LEXISCAN (CPT 15395/05381/); Future  - CARD ECHO 2D DOPPLER (CPT=93306); Future  - Derm Referral - In Network  - CT ABDOMEN+PELVIS (W AND W/O CONTRAST)(CPT=74178); Future  - CBC With Differential With Platelet  - Comp Metabolic Panel (14)  - Lipid Panel  - TSH W Reflex To Free T4  - Vitamin D  - Sed Rate, Westergren (Automated)  - C-Reactive Protein [E]  - CELIAC DISEASE SCREEN Reflex    2. Welcome to Medicare preventive visit  Diet and exercise   Self breast exam   Sun screen recommended   Fasting blood work   CT lung screen in 9/24  Advised to have a colonoscopy   Mammogram screening   Declined vaccinations   - [facility] CHRISTIAN 2D+3D SCREENING BILAT (CPT=77067/89987); Future  - MRI HIPS (W+WO), LEFT (CPT=57265); Future  - XR DEXA BONE DENSITOMETRY (CPT=15180); Future  - CBC With Differential With Platelet; Future  - Comp Metabolic Panel (14); Future  - Lipid Panel; Future  - TSH W Reflex To Free T4; Future  - Vitamin D; Future  - Sed Rate, Westergren (Automated); Future  - C-Reactive Protein [E]; Future  - CELIAC DISEASE SCREEN Reflex; Future  - C. diff toxigenic PCR (OPT) [E]; Future  - CT LUNG LD  SCREENING(CPT=71271); Future  - Calprotectin, Fecal; Future  - Gastro Referral - In Network  - CARD NUC STRESS TEST LEXISCAN (CPT 89528/80455/); Future  - CARD ECHO 2D DOPPLER (CPT=93306); Future  - Derm Referral - In Network  - CT ABDOMEN+PELVIS (W AND W/O CONTRAST)(CPT=74178); Future  - CBC With Differential With Platelet  - Comp Metabolic Panel (14)  - Lipid Panel  - TSH W Reflex To Free T4  - Vitamin D  - Sed Rate, Westergren (Automated)  - C-Reactive Protein [E]  - CELIAC DISEASE SCREEN Reflex    3. Asymptomatic menopause  DEXA scan   - [facility] CHRISTIAN 2D+3D SCREENING BILAT (CPT=77067/84629); Future  - MRI HIPS (W+WO), LEFT (CPT=98023); Future  - XR DEXA BONE DENSITOMETRY (CPT=77080); Future  - CBC With Differential With Platelet; Future  - Comp Metabolic Panel (14); Future  - Lipid Panel; Future  - TSH W Reflex To Free T4; Future  - Vitamin D; Future  - Sed Rate, Westergren (Automated); Future  - C-Reactive Protein [E]; Future  - CELIAC DISEASE SCREEN Reflex; Future  - C. diff toxigenic PCR (OPT) [E]; Future  - CT LUNG LD SCREENING(CPT=71271); Future  - Calprotectin, Fecal; Future  - Gastro Referral - In Network  - CARD NUC STRESS TEST LEXISCAN (CPT 61603/03640/); Future  - CARD ECHO 2D DOPPLER (CPT=93306); Future  - Derm Referral - In Network  - CT ABDOMEN+PELVIS (W AND W/O CONTRAST)(CPT=74178); Future  - CBC With Differential With Platelet  - Comp Metabolic Panel (14)  - Lipid Panel  - TSH W Reflex To Free T4  - Vitamin D  - Sed Rate, Westergren (Automated)  - C-Reactive Protein [E]  - CELIAC DISEASE SCREEN Reflex    4. Encounter for screening mammogram for malignant neoplasm of breast  Mammogram screening   - [facility] CHRISTIAN 2D+3D SCREENING BILAT (CPT=77067/21674); Future  - MRI HIPS (W+WO), LEFT (CPT=45523); Future  - XR DEXA BONE DENSITOMETRY (CPT=77080); Future  - CBC With Differential With Platelet; Future  - Comp Metabolic Panel (14); Future  - Lipid Panel; Future  - TSH W Reflex To Free T4; Future  -  Vitamin D; Future  - Sed Rate, Westergren (Automated); Future  - C-Reactive Protein [E]; Future  - CELIAC DISEASE SCREEN Reflex; Future  - C. diff toxigenic PCR (OPT) [E]; Future  - CT LUNG LD SCREENING(CPT=71271); Future  - Calprotectin, Fecal; Future  - Gastro Referral - In Network  - CARD NUC STRESS TEST LEXISCAN (CPT 63387/19020/); Future  - CARD ECHO 2D DOPPLER (CPT=93306); Future  - Derm Referral - In Network  - CT ABDOMEN+PELVIS (W AND W/O CONTRAST)(CPT=74178); Future  - CBC With Differential With Platelet  - Comp Metabolic Panel (14)  - Lipid Panel  - TSH W Reflex To Free T4  - Vitamin D  - Sed Rate, Westergren (Automated)  - C-Reactive Protein [E]  - CELIAC DISEASE SCREEN Reflex    5. Lung nodules  CT lung screen     - [facility] CHRISTIAN 2D+3D SCREENING BILAT (CPT=77067/60881); Future  - MRI HIPS (W+WO), LEFT (CPT=73723); Future  - XR DEXA BONE DENSITOMETRY (CPT=77080); Future  - CBC With Differential With Platelet; Future  - Comp Metabolic Panel (14); Future  - Lipid Panel; Future  - TSH W Reflex To Free T4; Future  - Vitamin D; Future  - Sed Rate, Westergren (Automated); Future  - C-Reactive Protein [E]; Future  - CELIAC DISEASE SCREEN Reflex; Future  - C. diff toxigenic PCR (OPT) [E]; Future  - CT LUNG LD SCREENING(CPT=71271); Future  - Calprotectin, Fecal; Future  - Gastro Referral - In Network  - CARD NUC STRESS TEST LEXISCAN (CPT 49405/55733/); Future  - CARD ECHO 2D DOPPLER (CPT=93306); Future  - Derm Referral - In Network  - CT ABDOMEN+PELVIS (W AND W/O CONTRAST)(CPT=74178); Future  - CBC With Differential With Platelet  - Comp Metabolic Panel (14)  - Lipid Panel  - TSH W Reflex To Free T4  - Vitamin D  - Sed Rate, Westergren (Automated)  - C-Reactive Protein [E]  - CELIAC DISEASE SCREEN Reflex    6. Pulmonary emphysema, unspecified emphysema type (HCC)  CT lung screen   Continue inhaler   - [facility] CHRISTIAN 2D+3D SCREENING BILAT (CPT=77067/82119); Future  - MRI HIPS (W+WO), LEFT (CPT=73723); Future  - XR  DEXA BONE DENSITOMETRY (CPT=77080); Future  - CBC With Differential With Platelet; Future  - Comp Metabolic Panel (14); Future  - Lipid Panel; Future  - TSH W Reflex To Free T4; Future  - Vitamin D; Future  - Sed Rate, Westergren (Automated); Future  - C-Reactive Protein [E]; Future  - CELIAC DISEASE SCREEN Reflex; Future  - C. diff toxigenic PCR (OPT) [E]; Future  - CT LUNG LD SCREENING(CPT=71271); Future  - Calprotectin, Fecal; Future  - Gastro Referral - In Network  - CARD NUC STRESS TEST LEXISCAN (CPT 14777/19567/); Future  - CARD ECHO 2D DOPPLER (CPT=93306); Future  - Derm Referral - In Network  - CT ABDOMEN+PELVIS (W AND W/O CONTRAST)(CPT=95178); Future  - CBC With Differential With Platelet  - Comp Metabolic Panel (14)  - Lipid Panel  - TSH W Reflex To Free T4  - Vitamin D  - Sed Rate, Westergren (Automated)  - C-Reactive Protein [E]  - CELIAC DISEASE SCREEN Reflex    7. Hypothyroidism, unspecified type  TSH level   Continue levothyroxine     - [facility] CHRISTIAN 2D+3D SCREENING BILAT (CPT=77067/88419); Future  - MRI HIPS (W+WO), LEFT (CPT=26366); Future  - XR DEXA BONE DENSITOMETRY (CPT=77080); Future  - CBC With Differential With Platelet; Future  - Comp Metabolic Panel (14); Future  - Lipid Panel; Future  - TSH W Reflex To Free T4; Future  - Vitamin D; Future  - Sed Rate, Westergren (Automated); Future  - C-Reactive Protein [E]; Future  - CELIAC DISEASE SCREEN Reflex; Future  - C. diff toxigenic PCR (OPT) [E]; Future  - CT LUNG LD SCREENING(CPT=71271); Future  - Calprotectin, Fecal; Future  - Gastro Referral - In Network  - CARD NUC STRESS TEST LEXISCAN (CPT 34974/38142/); Future  - CARD ECHO 2D DOPPLER (CPT=93306); Future  - Derm Referral - In Network  - CT ABDOMEN+PELVIS (W AND W/O CONTRAST)(CPT=74178); Future  - CBC With Differential With Platelet  - Comp Metabolic Panel (14)  - Lipid Panel  - TSH W Reflex To Free T4  - Vitamin D  - Sed Rate, Westergren (Automated)  - C-Reactive Protein [E]  - CELIAC  DISEASE SCREEN Reflex    8. Essential hypertension  BP at goal   Continue same meds   - [facility] CHRISTIAN 2D+3D SCREENING BILAT (CPT=77067/39487); Future  - MRI HIPS (W+WO), LEFT (CPT=73723); Future  - XR DEXA BONE DENSITOMETRY (CPT=77080); Future  - CBC With Differential With Platelet; Future  - Comp Metabolic Panel (14); Future  - Lipid Panel; Future  - TSH W Reflex To Free T4; Future  - Vitamin D; Future  - Sed Rate, Westergren (Automated); Future  - C-Reactive Protein [E]; Future  - CELIAC DISEASE SCREEN Reflex; Future  - C. diff toxigenic PCR (OPT) [E]; Future  - CT LUNG LD SCREENING(CPT=71271); Future  - Calprotectin, Fecal; Future  - Gastro Referral - In Network  - CARD NUC STRESS TEST LEXISCAN (CPT 24075/33689/); Future  - CARD ECHO 2D DOPPLER (CPT=93306); Future  - Derm Referral - In Network  - CT ABDOMEN+PELVIS (W AND W/O CONTRAST)(CPT=74178); Future  - CBC With Differential With Platelet  - Comp Metabolic Panel (14)  - Lipid Panel  - TSH W Reflex To Free T4  - Vitamin D  - Sed Rate, Westergren (Automated)  - C-Reactive Protein [E]  - CELIAC DISEASE SCREEN Reflex    9. Mild intermittent asthma without complication (HCC)  Continue albutreol   - [facility] CHRISTIAN 2D+3D SCREENING BILAT (CPT=77067/91902); Future  - MRI HIPS (W+WO), LEFT (CPT=61223); Future  - XR DEXA BONE DENSITOMETRY (CPT=77080); Future  - CBC With Differential With Platelet; Future  - Comp Metabolic Panel (14); Future  - Lipid Panel; Future  - TSH W Reflex To Free T4; Future  - Vitamin D; Future  - Sed Rate, Westergren (Automated); Future  - C-Reactive Protein [E]; Future  - CELIAC DISEASE SCREEN Reflex; Future  - C. diff toxigenic PCR (OPT) [E]; Future  - CT LUNG LD SCREENING(CPT=71271); Future  - Calprotectin, Fecal; Future  - Gastro Referral - In Network  - CARD NUC STRESS TEST LEXISCAN (CPT 11131/08054/); Future  - CARD ECHO 2D DOPPLER (CPT=93306); Future  - Derm Referral - In Network  - CT ABDOMEN+PELVIS (W AND W/O CONTRAST)(CPT=74178);  Future  - CBC With Differential With Platelet  - Comp Metabolic Panel (14)  - Lipid Panel  - TSH W Reflex To Free T4  - Vitamin D  - Sed Rate, Westergren (Automated)  - C-Reactive Protein [E]  - CELIAC DISEASE SCREEN Reflex    10. Coronary artery calcification E/M  Advised to have stress test for further evaluation  Continue statin   Advised to restart aspirin   - [facility] CHRISTIAN 2D+3D SCREENING BILAT (CPT=77067/48750); Future  - MRI HIPS (W+WO), LEFT (CPT=28123); Future  - XR DEXA BONE DENSITOMETRY (CPT=77080); Future  - CBC With Differential With Platelet; Future  - Comp Metabolic Panel (14); Future  - Lipid Panel; Future  - TSH W Reflex To Free T4; Future  - Vitamin D; Future  - Sed Rate, Westergren (Automated); Future  - C-Reactive Protein [E]; Future  - CELIAC DISEASE SCREEN Reflex; Future  - C. diff toxigenic PCR (OPT) [E]; Future  - CT LUNG LD SCREENING(CPT=57866); Future  - Calprotectin, Fecal; Future  - Gastro Referral - In Network  - CARD NUC STRESS TEST LEXISCAN (CPT 40017/03136/); Future  - CARD ECHO 2D DOPPLER (CPT=93306); Future  - Derm Referral - In Network  - CT ABDOMEN+PELVIS (W AND W/O CONTRAST)(CPT=74178); Future  - CBC With Differential With Platelet  - Comp Metabolic Panel (14)  - Lipid Panel  - TSH W Reflex To Free T4  - Vitamin D  - Sed Rate, Westergren (Automated)  - C-Reactive Protein [E]  - CELIAC DISEASE SCREEN Reflex    11. History of smoking  Stopped   CT lung screen   - [facility] CHRISTIAN 2D+3D SCREENING BILAT (CPT=77067/05193); Future  - MRI HIPS (W+WO), LEFT (CPT=88423); Future  - XR DEXA BONE DENSITOMETRY (CPT=77080); Future  - CBC With Differential With Platelet; Future  - Comp Metabolic Panel (14); Future  - Lipid Panel; Future  - TSH W Reflex To Free T4; Future  - Vitamin D; Future  - Sed Rate, Westergren (Automated); Future  - C-Reactive Protein [E]; Future  - CELIAC DISEASE SCREEN Reflex; Future  - C. diff toxigenic PCR (OPT) [E]; Future  - CT LUNG LD SCREENING(CPT=57957); Future  -  Calprotectin, Fecal; Future  - Gastro Referral - In Network  - CARD NUC STRESS TEST LEXISCAN (CPT 51502/67039/); Future  - CARD ECHO 2D DOPPLER (CPT=93306); Future  - Derm Referral - In Network  - CT ABDOMEN+PELVIS (W AND W/O CONTRAST)(CPT=74178); Future  - CBC With Differential With Platelet  - Comp Metabolic Panel (14)  - Lipid Panel  - TSH W Reflex To Free T4  - Vitamin D  - Sed Rate, Westergren (Automated)  - C-Reactive Protein [E]  - CELIAC DISEASE SCREEN Reflex    12. Atherosclerosis of coronary artery of native heart without angina pectoris, unspecified vessel or lesion type  Statin   Advised to start ASA   - [facility] CHRISTIAN 2D+3D SCREENING BILAT (CPT=77067/80785); Future  - MRI HIPS (W+WO), LEFT (CPT=97752); Future  - XR DEXA BONE DENSITOMETRY (CPT=91314); Future  - CBC With Differential With Platelet; Future  - Comp Metabolic Panel (14); Future  - Lipid Panel; Future  - TSH W Reflex To Free T4; Future  - Vitamin D; Future  - Sed Rate, Westergren (Automated); Future  - C-Reactive Protein [E]; Future  - CELIAC DISEASE SCREEN Reflex; Future  - C. diff toxigenic PCR (OPT) [E]; Future  - CT LUNG LD SCREENING(CPT=71271); Future  - Calprotectin, Fecal; Future  - Gastro Referral - In Network  - CARD NUC STRESS TEST LEXISCAN (CPT 90150/14226/); Future  - CARD ECHO 2D DOPPLER (CPT=93306); Future  - Derm Referral - In Network  - CT ABDOMEN+PELVIS (W AND W/O CONTRAST)(CPT=74178); Future  - CBC With Differential With Platelet  - Comp Metabolic Panel (14)  - Lipid Panel  - TSH W Reflex To Free T4  - Vitamin D  - Sed Rate, Westergren (Automated)  - C-Reactive Protein [E]  - CELIAC DISEASE SCREEN Reflex    13. Diarrhea, unspecified type E/M  Cbc , cmp, esr, crp, tsh   Celiac panel   Discussed the importance of seeing GI   Fecal robert protectin and C diff   Consider CT abdomen and pelvis based on blood work     - [facility] CHRISTIAN 2D+3D SCREENING BILAT (CPT=77067/41163); Future  - MRI HIPS (W+WO), LEFT (CPT=62995); Future  - XR  DEXA BONE DENSITOMETRY (CPT=77080); Future  - CBC With Differential With Platelet; Future  - Comp Metabolic Panel (14); Future  - Lipid Panel; Future  - TSH W Reflex To Free T4; Future  - Vitamin D; Future  - Sed Rate, Westergren (Automated); Future  - C-Reactive Protein [E]; Future  - CELIAC DISEASE SCREEN Reflex; Future  - C. diff toxigenic PCR (OPT) [E]; Future  - CT LUNG LD SCREENING(CPT=71271); Future  - Calprotectin, Fecal; Future  - Gastro Referral - In Network  - CARD NUC STRESS TEST LEXISCAN (CPT 74074/53030/); Future  - CARD ECHO 2D DOPPLER (CPT=93306); Future  - Derm Referral - In Network  - CT ABDOMEN+PELVIS (W AND W/O CONTRAST)(CPT=40178); Future  - CBC With Differential With Platelet  - Comp Metabolic Panel (14)  - Lipid Panel  - TSH W Reflex To Free T4  - Vitamin D  - Sed Rate, Westergren (Automated)  - C-Reactive Protein [E]  - CELIAC DISEASE SCREEN Reflex    14. Vitamin D deficiency  Vitamin d   - [facility] CHRISTIAN 2D+3D SCREENING BILAT (CPT=27833/55614); Future  - MRI HIPS (W+WO), LEFT (CPT=92178); Future  - XR DEXA BONE DENSITOMETRY (CPT=77080); Future  - CBC With Differential With Platelet; Future  - Comp Metabolic Panel (14); Future  - Lipid Panel; Future  - TSH W Reflex To Free T4; Future  - Vitamin D; Future  - Sed Rate, Westergren (Automated); Future  - C-Reactive Protein [E]; Future  - CELIAC DISEASE SCREEN Reflex; Future  - C. diff toxigenic PCR (OPT) [E]; Future  - CT LUNG LD SCREENING(CPT=71271); Future  - Calprotectin, Fecal; Future  - Gastro Referral - In Network  - CARD NUC STRESS TEST LEXISCAN (CPT 32265/63870/); Future  - CARD ECHO 2D DOPPLER (CPT=93306); Future  - Derm Referral - In Network  - CT ABDOMEN+PELVIS (W AND W/O CONTRAST)(CPT=80178); Future  - CBC With Differential With Platelet  - Comp Metabolic Panel (14)  - Lipid Panel  - TSH W Reflex To Free T4  - Vitamin D  - Sed Rate, Westergren (Automated)  - C-Reactive Protein [E]  - CELIAC DISEASE SCREEN Reflex    15. Elevated  coronary artery calcium score  Stress test   - [facility] CHRISTIAN 2D+3D SCREENING BILAT (CPT=77067/83695); Future  - MRI HIPS (W+WO), LEFT (CPT=73723); Future  - XR DEXA BONE DENSITOMETRY (CPT=77080); Future  - CBC With Differential With Platelet; Future  - Comp Metabolic Panel (14); Future  - Lipid Panel; Future  - TSH W Reflex To Free T4; Future  - Vitamin D; Future  - Sed Rate, Westergren (Automated); Future  - C-Reactive Protein [E]; Future  - CELIAC DISEASE SCREEN Reflex; Future  - C. diff toxigenic PCR (OPT) [E]; Future  - CT LUNG LD SCREENING(CPT=71271); Future  - Calprotectin, Fecal; Future  - Gastro Referral - In Network  - CARD NUC STRESS TEST LEXISCAN (CPT 90956/14859/); Future  - CARD ECHO 2D DOPPLER (CPT=93306); Future  - Derm Referral - In Network  - CT ABDOMEN+PELVIS (W AND W/O CONTRAST)(CPT=74178); Future  - CBC With Differential With Platelet  - Comp Metabolic Panel (14)  - Lipid Panel  - TSH W Reflex To Free T4  - Vitamin D  - Sed Rate, Westergren (Automated)  - C-Reactive Protein [E]  - CELIAC DISEASE SCREEN Reflex    16. Mitral valve insufficiency, unspecified etiology  Echo follow up   - [facility] CHRISTIAN 2D+3D SCREENING BILAT (CPT=77067/26022); Future  - MRI HIPS (W+WO), LEFT (CPT=73723); Future  - XR DEXA BONE DENSITOMETRY (CPT=77080); Future  - CBC With Differential With Platelet; Future  - Comp Metabolic Panel (14); Future  - Lipid Panel; Future  - TSH W Reflex To Free T4; Future  - Vitamin D; Future  - Sed Rate, Westergren (Automated); Future  - C-Reactive Protein [E]; Future  - CELIAC DISEASE SCREEN Reflex; Future  - C. diff toxigenic PCR (OPT) [E]; Future  - CT LUNG LD SCREENING(CPT=71271); Future  - Calprotectin, Fecal; Future  - Gastro Referral - In Network  - CARD NUC STRESS TEST LEXISCAN (CPT 12738/69948/); Future  - CARD ECHO 2D DOPPLER (CPT=93306); Future  - Derm Referral - In Network  - CT ABDOMEN+PELVIS (W AND W/O CONTRAST)(CPT=74178); Future  - CBC With Differential With Platelet  - Comp  Metabolic Panel (14)  - Lipid Panel  - TSH W Reflex To Free T4  - Vitamin D  - Sed Rate, Westergren (Automated)  - C-Reactive Protein [E]  - CELIAC DISEASE SCREEN Reflex    17. Screening for osteoporosis  Dexa scan   - [facility] CHRISTIAN 2D+3D SCREENING BILAT (CPT=77067/16579); Future  - MRI HIPS (W+WO), LEFT (CPT=86323); Future  - XR DEXA BONE DENSITOMETRY (CPT=77080); Future  - CBC With Differential With Platelet; Future  - Comp Metabolic Panel (14); Future  - Lipid Panel; Future  - TSH W Reflex To Free T4; Future  - Vitamin D; Future  - Sed Rate, Westergren (Automated); Future  - C-Reactive Protein [E]; Future  - CELIAC DISEASE SCREEN Reflex; Future  - C. diff toxigenic PCR (OPT) [E]; Future  - CT LUNG LD SCREENING(CPT=71271); Future  - Calprotectin, Fecal; Future  - Gastro Referral - In Network  - CARD NUC STRESS TEST LEXISCAN (CPT 55606/42601/); Future  - CARD ECHO 2D DOPPLER (CPT=93306); Future  - Derm Referral - In Network  - CT ABDOMEN+PELVIS (W AND W/O CONTRAST)(CPT=74178); Future  - CBC With Differential With Platelet  - Comp Metabolic Panel (14)  - Lipid Panel  - TSH W Reflex To Free T4  - Vitamin D  - Sed Rate, Westergren (Automated)  - C-Reactive Protein [E]  - CELIAC DISEASE SCREEN Reflex    18. Osteoarthritis of cervical spine, unspecified spinal osteoarthritis complication status  Stable   - [facility] CHRISTIAN 2D+3D SCREENING BILAT (CPT=77067/44697); Future  - MRI HIPS (W+WO), LEFT (CPT=80923); Future  - XR DEXA BONE DENSITOMETRY (CPT=77080); Future  - CBC With Differential With Platelet; Future  - Comp Metabolic Panel (14); Future  - Lipid Panel; Future  - TSH W Reflex To Free T4; Future  - Vitamin D; Future  - Sed Rate, Westergren (Automated); Future  - C-Reactive Protein [E]; Future  - CELIAC DISEASE SCREEN Reflex; Future  - C. diff toxigenic PCR (OPT) [E]; Future  - CT LUNG LD SCREENING(CPT=71271); Future  - Calprotectin, Fecal; Future  - Gastro Referral - In Network  - CARD NUC STRESS TEST LEXISCAN (CPT  73404/12441/); Future  - CARD ECHO 2D DOPPLER (CPT=93306); Future  - Derm Referral - In Network  - CT ABDOMEN+PELVIS (W AND W/O CONTRAST)(CPT=74178); Future  - CBC With Differential With Platelet  - Comp Metabolic Panel (14)  - Lipid Panel  - TSH W Reflex To Free T4  - Vitamin D  - Sed Rate, Westergren (Automated)  - C-Reactive Protein [E]  - CELIAC DISEASE SCREEN Reflex    19. Screening for colon cancer  GI referral   - [facility] CHRISTIAN 2D+3D SCREENING BILAT (CPT=77067/73359); Future  - MRI HIPS (W+WO), LEFT (CPT=54623); Future  - XR DEXA BONE DENSITOMETRY (CPT=77080); Future  - CBC With Differential With Platelet; Future  - Comp Metabolic Panel (14); Future  - Lipid Panel; Future  - TSH W Reflex To Free T4; Future  - Vitamin D; Future  - Sed Rate, Westergren (Automated); Future  - C-Reactive Protein [E]; Future  - CELIAC DISEASE SCREEN Reflex; Future  - C. diff toxigenic PCR (OPT) [E]; Future  - CT LUNG LD SCREENING(CPT=71271); Future  - Calprotectin, Fecal; Future  - Gastro Referral - In Network  - CARD NUC STRESS TEST LEXISCAN (CPT 58273/17708/); Future  - CARD ECHO 2D DOPPLER (CPT=93306); Future  - Derm Referral - In Network  - CT ABDOMEN+PELVIS (W AND W/O CONTRAST)(CPT=74178); Future  - CBC With Differential With Platelet  - Comp Metabolic Panel (14)  - Lipid Panel  - TSH W Reflex To Free T4  - Vitamin D  - Sed Rate, Westergren (Automated)  - C-Reactive Protein [E]  - CELIAC DISEASE SCREEN Reflex    20. Pain of left hip E/M  MRI hip   - [facility] CHRISTIAN 2D+3D SCREENING BILAT (CPT=77067/54720); Future  - MRI HIPS (W+WO), LEFT (CPT=38223); Future  - XR DEXA BONE DENSITOMETRY (CPT=77080); Future  - CBC With Differential With Platelet; Future  - Comp Metabolic Panel (14); Future  - Lipid Panel; Future  - TSH W Reflex To Free T4; Future  - Vitamin D; Future  - Sed Rate, Westergren (Automated); Future  - C-Reactive Protein [E]; Future  - CELIAC DISEASE SCREEN Reflex; Future  - C. diff toxigenic PCR (OPT) [E]; Future  - CT  LUNG LD SCREENING(CPT=71271); Future  - Calprotectin, Fecal; Future  - Gastro Referral - In Network  - CARD NUC STRESS TEST LEXISCAN (CPT 25681/58850/); Future  - CARD ECHO 2D DOPPLER (CPT=93306); Future  - Derm Referral - In Network  - CT ABDOMEN+PELVIS (W AND W/O CONTRAST)(CPT=74178); Future  - CBC With Differential With Platelet  - Comp Metabolic Panel (14)  - Lipid Panel  - TSH W Reflex To Free T4  - Vitamin D  - Sed Rate, Westergren (Automated)  - C-Reactive Protein [E]  - CELIAC DISEASE SCREEN Reflex    21. Asymptomatic menopausal state  DExa scan   - [facility] CHRISTIAN 2D+3D SCREENING BILAT (CPT=77067/26503); Future  - MRI HIPS (W+WO), LEFT (CPT=06123); Future  - XR DEXA BONE DENSITOMETRY (CPT=77080); Future  - CBC With Differential With Platelet; Future  - Comp Metabolic Panel (14); Future  - Lipid Panel; Future  - TSH W Reflex To Free T4; Future  - Vitamin D; Future  - Sed Rate, Westergren (Automated); Future  - C-Reactive Protein [E]; Future  - CELIAC DISEASE SCREEN Reflex; Future  - C. diff toxigenic PCR (OPT) [E]; Future  - CT LUNG LD SCREENING(CPT=71271); Future  - Calprotectin, Fecal; Future  - Gastro Referral - In Network  - CARD NUC STRESS TEST LEXISCAN (CPT 85796/44761/); Future  - CARD ECHO 2D DOPPLER (CPT=93306); Future  - Derm Referral - In Network  - CT ABDOMEN+PELVIS (W AND W/O CONTRAST)(CPT=74178); Future  - CBC With Differential With Platelet  - Comp Metabolic Panel (14)  - Lipid Panel  - TSH W Reflex To Free T4  - Vitamin D  - Sed Rate, Westergren (Automated)  - C-Reactive Protein [E]  - CELIAC DISEASE SCREEN Reflex    22. Family history of lung cancer  CT lung screen   - [facility] CHRISTIAN 2D+3D SCREENING BILAT (CPT=77067/71086); Future  - MRI HIPS (W+WO), LEFT (CPT=72723); Future  - XR DEXA BONE DENSITOMETRY (CPT=77080); Future  - CBC With Differential With Platelet; Future  - Comp Metabolic Panel (14); Future  - Lipid Panel; Future  - TSH W Reflex To Free T4; Future  - Vitamin D; Future  - Sed  Rate, Westergren (Automated); Future  - C-Reactive Protein [E]; Future  - CELIAC DISEASE SCREEN Reflex; Future  - C. diff toxigenic PCR (OPT) [E]; Future  - CT LUNG LD SCREENING(CPT=71271); Future  - Calprotectin, Fecal; Future  - Gastro Referral - In Network  - CARD NUC STRESS TEST LEXISCAN (CPT 96517/72591/); Future  - CARD ECHO 2D DOPPLER (CPT=93306); Future  - Derm Referral - In Network  - CT ABDOMEN+PELVIS (W AND W/O CONTRAST)(CPT=74178); Future  - CBC With Differential With Platelet  - Comp Metabolic Panel (14)  - Lipid Panel  - TSH W Reflex To Free T4  - Vitamin D  - Sed Rate, Westergren (Automated)  - C-Reactive Protein [E]  - CELIAC DISEASE SCREEN Reflex    23. Screening for skin cancer  Advised to see derm for general skin check   - [facility] CHRISTIAN 2D+3D SCREENING BILAT (CPT=77093/92472); Future  - MRI HIPS (W+WO), LEFT (CPT=73723); Future  - XR DEXA BONE DENSITOMETRY (CPT=10680); Future  - CBC With Differential With Platelet; Future  - Comp Metabolic Panel (14); Future  - Lipid Panel; Future  - TSH W Reflex To Free T4; Future  - Vitamin D; Future  - Sed Rate, Westergren (Automated); Future  - C-Reactive Protein [E]; Future  - CELIAC DISEASE SCREEN Reflex; Future  - C. diff toxigenic PCR (OPT) [E]; Future  - CT LUNG LD SCREENING(CPT=71271); Future  - Calprotectin, Fecal; Future  - Gastro Referral - In Network  - CARD NUC STRESS TEST LEXISCAN (CPT 38929/22003/); Future  - CARD ECHO 2D DOPPLER (CPT=93306); Future  - Derm Referral - In Network  - CT ABDOMEN+PELVIS (W AND W/O CONTRAST)(CPT=74178); Future  - CBC With Differential With Platelet  - Comp Metabolic Panel (14)  - Lipid Panel  - TSH W Reflex To Free T4  - Vitamin D  - Sed Rate, Westergren (Automated)  - C-Reactive Protein [E]  - CELIAC DISEASE SCREEN Reflex   The patient indicates understanding of these issues and agrees to the plan.  Follow up in  3  month(s).  Reinforced healthy diet, lifestyle, and exercise.      Return in about 3 months  (around 8/29/2024).     Justin Ivey MD, 5/29/2024     Supplementary Documentation:   General Health:  In the past six months, have you lost more than 10 pounds without trying?: 2 - No  Has your appetite been poor?: No  Type of Diet: Balanced  How does the patient maintain a good energy level?: Daily Walks;Stretching  How would you describe your daily physical activity?: Moderate  How would you describe your current health state?: Good  How do you maintain positive mental well-being?: Social Interaction;Games;Visiting Friends;Visiting Family  On a scale of 0 to 10, with 0 being no pain and 10 being severe pain, what is your pain level?: 5 - (Moderate)  In the past six months, have you experienced urine leakage?: 0-No  At any time do you feel concerned for the safety/well-being of yourself and/or your children, in your home or elsewhere?: No  Have you had any immunizations at another office such as Influenza, Hepatitis B, Tetanus, or Pneumococcal?: No       May Mir's SCREENING SCHEDULE   Tests on this list are recommended by your physician but may not be covered, or covered at this frequency, by your insurer.   Please check with your insurance carrier before scheduling to verify coverage.   PREVENTATIVE SERVICES FREQUENCY &  COVERAGE DETAILS LAST COMPLETION DATE   Diabetes Screening    Fasting Blood Sugar /  Glucose    One screening every 12 months if never tested or if previously tested but not diagnosed with pre-diabetes   One screening every 6 months if diagnosed with pre-diabetes Lab Results   Component Value Date    GLU 75 05/02/2023        Cardiovascular Disease Screening    Lipid Panel  Cholesterol  Lipoprotein (HDL)  Triglycerides Covered every 5 years for all Medicare beneficiaries without apparent signs or symptoms of cardiovascular disease Lab Results   Component Value Date    CHOLEST 156 05/02/2023    HDL 68 05/02/2023    LDL 72 05/02/2023    TRIG 82 05/02/2023         Electrocardiogram (EKG)    Covered if needed at Welcome to Medicare, and non-screening if indicated for medical reasons 04/14/2021      Ultrasound Screening for Abdominal Aortic Aneurysm (AAA) Covered once in a lifetime for one of the following risk factors    Men who are 65-75 years old and have ever smoked    Anyone with a family history -     Colorectal Cancer Screening  Covered for ages 50-85; only need ONE of the following:    Colonoscopy   Covered every 10 years    Covered every 2 years if patient is at high risk or previous colonoscopy was abnormal -    Health Maintenance   Topic Date Due    Colorectal Cancer Screening  06/12/2020       Flexible Sigmoidoscopy   Covered every 4 years -    Fecal Occult Blood Test Covered annually -   Bone Density Screening    Bone density screening    Covered every 2 years after age 65 if diagnosed with risk of osteoporosis or estrogen deficiency.    Covered yearly for long-term glucocorticoid medication use (Steroids) No results found for this or any previous visit.      Health Maintenance Due   Topic Date Due    DEXA Scan  Never done      Pap and Pelvic    Pap   Covered every 2 years for women at normal risk; Annually if at high risk 12/20/2021  Health Maintenance   Topic Date Due    Pap Smear  12/20/2024       Chlamydia Annually if high risk -  No recommendations at this time   Screening Mammogram    Mammogram     Recommend annually for all female patients aged 40 and older    One baseline mammogram covered for patients aged 35-39 09/19/2023    Health Maintenance   Topic Date Due    Mammogram  09/19/2024       Immunizations    Influenza Covered once per flu season  Please get every year -  No recommendations at this time    Pneumococcal Each vaccine (Mevwijj53 & Rceitqasg89) covered once after 65 Prevnar 13: -    [id number]: -     Pneumococcal Vaccination(1 of 2 - PCV) Never done    Hepatitis B One screening covered for patients with certain risk factors   -  No recommendations at this time     Tetanus Toxoid Not covered by Medicare Part B unless medically necessary (cut with metal); may be covered with your pharmacy prescription benefits -    Tetanus, Diptheria and Pertusis TD and TDaP Not covered by Medicare Part B -  No recommendations at this time    Zoster Not covered by Medicare Part B; may be covered with your pharmacy  prescription benefits -  Zoster Vaccines(1 of 2) Never done     Annual Monitoring of Persistent Medications (ACE/ARB, digoxin diuretics, anticonvulsants)    Potassium Annually Lab Results   Component Value Date    K 4.8 05/02/2023         Creatinine   Annually Lab Results   Component Value Date    CREATSERUM 0.69 05/02/2023         BUN Annually Lab Results   Component Value Date    BUN 14 05/02/2023       Drug Serum Conc Annually No results found for: \"DIGOXIN\", \"DIG\", \"VALP\"           Chronic Obstructive Pulmonary Disease (COPD)    Spirometry Annually Spirometry date:             Discussed E/M code + Z 00 code since with other complaints / work up

## 2024-05-30 DIAGNOSIS — Z12.11 SCREENING FOR COLON CANCER: ICD-10-CM

## 2024-05-30 DIAGNOSIS — Z00.00 ANNUAL PHYSICAL EXAM: ICD-10-CM

## 2024-05-30 LAB — TTG IGA SER-ACNC: 0.4 U/ML (ref ?–7)

## 2024-05-30 RX ORDER — LISINOPRIL 5 MG/1
5 TABLET ORAL DAILY
Qty: 90 TABLET | Refills: 0 | Status: SHIPPED | OUTPATIENT
Start: 2024-05-30

## 2024-05-30 RX ORDER — MONTELUKAST SODIUM 10 MG/1
10 TABLET ORAL EVERY EVENING
Qty: 90 TABLET | Refills: 0 | Status: SHIPPED | OUTPATIENT
Start: 2024-05-30

## 2024-05-30 RX ORDER — ATORVASTATIN CALCIUM 10 MG/1
10 TABLET, FILM COATED ORAL DAILY
Qty: 90 TABLET | Refills: 0 | Status: SHIPPED | OUTPATIENT
Start: 2024-05-30

## 2024-07-19 ENCOUNTER — LAB ENCOUNTER (OUTPATIENT)
Dept: LAB | Age: 64
End: 2024-07-19
Attending: INTERNAL MEDICINE
Payer: MEDICARE

## 2024-07-19 LAB — C DIFF TOX B STL QL: NEGATIVE

## 2024-07-22 NOTE — TELEPHONE ENCOUNTER
A refill request was received for:  Requested Prescriptions     Pending Prescriptions Disp Refills    LEVOTHYROXINE 200 MCG Oral Tab [Pharmacy Med Name: Levothyroxine Sodium 200 Mcg Tab Lupi] 90 tablet 0     Sig: Take 1 tablet (200 mcg total) by mouth before breakfast.     Last refill date:  5/31/24    Last office visit: 5/29/24    Future Appointments   Date Time Provider Department Center   10/21/2024 10:20 AM Justin Ivey MD [facility] [address]

## 2024-07-23 RX ORDER — LEVOTHYROXINE SODIUM 200 UG/1
200 TABLET ORAL
Qty: 90 TABLET | Refills: 0 | OUTPATIENT
Start: 2024-07-23

## 2024-07-25 RX ORDER — LEVOTHYROXINE SODIUM 175 UG/1
175 TABLET ORAL
Qty: 90 TABLET | Refills: 0 | OUTPATIENT
Start: 2024-07-25 | End: 2024-10-23

## 2024-08-13 ENCOUNTER — MED REC SCAN ONLY (OUTPATIENT)
Dept: INTERNAL MEDICINE CLINIC | Facility: CLINIC | Age: 64
End: 2024-08-13

## 2024-08-13 DIAGNOSIS — M16.9 OSTEOARTHRITIS OF HIP, UNSPECIFIED LATERALITY, UNSPECIFIED OSTEOARTHRITIS TYPE: Primary | ICD-10-CM

## 2024-09-01 DIAGNOSIS — Z00.00 ANNUAL PHYSICAL EXAM: ICD-10-CM

## 2024-09-01 DIAGNOSIS — Z12.11 SCREENING FOR COLON CANCER: ICD-10-CM

## 2024-09-03 RX ORDER — ATORVASTATIN CALCIUM 10 MG/1
10 TABLET, FILM COATED ORAL DAILY
Qty: 90 TABLET | Refills: 0 | Status: SHIPPED | OUTPATIENT
Start: 2024-09-03

## 2024-09-03 RX ORDER — MONTELUKAST SODIUM 10 MG/1
10 TABLET ORAL EVERY EVENING
Qty: 90 TABLET | Refills: 0 | Status: SHIPPED | OUTPATIENT
Start: 2024-09-03

## 2024-09-03 RX ORDER — LISINOPRIL 5 MG/1
5 TABLET ORAL DAILY
Qty: 90 TABLET | Refills: 0 | Status: SHIPPED | OUTPATIENT
Start: 2024-09-03

## 2024-09-18 ENCOUNTER — LAB ENCOUNTER (OUTPATIENT)
Dept: LAB | Facility: HOSPITAL | Age: 64
End: 2024-09-18
Attending: INTERNAL MEDICINE
Payer: MEDICARE

## 2024-09-18 DIAGNOSIS — R79.89 ABNORMAL THYROID BLOOD TEST: ICD-10-CM

## 2024-09-18 DIAGNOSIS — E03.9 HYPOTHYROIDISM, UNSPECIFIED TYPE: ICD-10-CM

## 2024-09-18 LAB — TSI SER-ACNC: 3.27 MIU/ML (ref 0.55–4.78)

## 2024-09-18 PROCEDURE — 84443 ASSAY THYROID STIM HORMONE: CPT

## 2024-09-18 PROCEDURE — 36415 COLL VENOUS BLD VENIPUNCTURE: CPT

## 2024-09-19 ENCOUNTER — PATIENT MESSAGE (OUTPATIENT)
Dept: INTERNAL MEDICINE CLINIC | Facility: CLINIC | Age: 64
End: 2024-09-19

## 2024-09-19 RX ORDER — LEVOTHYROXINE SODIUM 175 UG/1
175 TABLET ORAL
Qty: 90 TABLET | Refills: 1 | Status: SHIPPED | OUTPATIENT
Start: 2024-09-19 | End: 2024-12-18

## 2024-09-19 NOTE — TELEPHONE ENCOUNTER
From: May Mir  To: Justin Ivey  Sent: 9/19/2024 8:44 AM CDT  Subject: Levothyroxine     Good Morning.I would need a refill of my levothyroxine 175 sent to [facility] in [place].  Thank you,  May Mir

## 2024-10-30 ENCOUNTER — TELEPHONE (OUTPATIENT)
Dept: INTERNAL MEDICINE CLINIC | Facility: CLINIC | Age: 64
End: 2024-10-30

## 2024-10-30 NOTE — TELEPHONE ENCOUNTER
Patient's  called the office requesting a refill, Albuterol Inhaler.    To be faxed to the [facility].  As it is cheaper to get it filled.    Fax # [phone].

## 2024-10-31 NOTE — TELEPHONE ENCOUNTER
Called patient to confirm inhaler prescription.   Will fax prescription to pharmacy given below.   FABIAN SOLORZANO

## 2024-12-08 DIAGNOSIS — Z12.11 SCREENING FOR COLON CANCER: ICD-10-CM

## 2024-12-08 DIAGNOSIS — Z00.00 ANNUAL PHYSICAL EXAM: ICD-10-CM

## 2024-12-09 ENCOUNTER — PATIENT MESSAGE (OUTPATIENT)
Dept: INTERNAL MEDICINE CLINIC | Facility: CLINIC | Age: 64
End: 2024-12-09

## 2024-12-09 RX ORDER — LISINOPRIL 5 MG/1
5 TABLET ORAL DAILY
Qty: 30 TABLET | Refills: 0 | Status: SHIPPED | OUTPATIENT
Start: 2024-12-09

## 2024-12-09 NOTE — TELEPHONE ENCOUNTER
Future Appt. NO FUTURE APPT.     Last Visit: 05/29/2024    Medication Requested:  Requested Prescriptions     Pending Prescriptions Disp Refills    LISINOPRIL 5 MG Oral Tab [Pharmacy Med Name: Lisinopril 5 Mg Tab Solc] 90 tablet 0     Sig: Take 1 tablet (5 mg total) by mouth daily.      Last refill:        Disp Refills Start End     LISINOPRIL 5 MG Oral Tab 90 tablet 0 9/3/2024 --    Sig - Route: Take 1 tablet (5 mg total) by mouth daily. - Oral        Hypertension Medications Protocol [id number]/08/2024 09:23 AM   Protocol Details CMP or BMP in past 12 months    Last BP reading less than 140/90    In person appointment or virtual visit in the past 12 mos or appointment in next 3 mos    EGFRCR or GFRNAA > 50

## 2024-12-12 RX ORDER — LISINOPRIL 5 MG/1
5 TABLET ORAL DAILY
Qty: 90 TABLET | Refills: 1 | Status: SHIPPED | OUTPATIENT
Start: 2024-12-12 | End: 2025-03-12

## 2024-12-13 RX ORDER — ATORVASTATIN CALCIUM 10 MG/1
10 TABLET, FILM COATED ORAL DAILY
Qty: 90 TABLET | Refills: 0 | Status: SHIPPED | OUTPATIENT
Start: 2024-12-13

## 2024-12-13 NOTE — TELEPHONE ENCOUNTER
Future Appt. NO FUTURE APPT.    Last Visit: 05/29/2024    Medication Requested:  Requested Prescriptions     Pending Prescriptions Disp Refills    ATORVASTATIN 10 MG Oral Tab [Pharmacy Med Name: Atorvastatin Calcium 10 Mg Tab Nort] 90 tablet 0     Sig: TAKE ONE TABLET BY MOUTH DAILY        Last refill:        Disp Refills Start End     ATORVASTATIN 10 MG Oral Tab 90 tablet 0 9/3/2024 --    Sig - Route: TAKE ONE TABLET BY MOUTH DAILY - Oral      Cholesterol Medication Protocol [id number]/13/2024 09:23 AM   Protocol Details ALT < 80    ALT resulted within past year    Lipid panel within past 12 months    In person appointment or virtual visit in the past 12 mos or appointment in next 3 mos

## 2024-12-31 ENCOUNTER — MED REC SCAN ONLY (OUTPATIENT)
Dept: INTERNAL MEDICINE CLINIC | Facility: CLINIC | Age: 64
End: 2024-12-31

## 2025-01-31 ENCOUNTER — PATIENT MESSAGE (OUTPATIENT)
Dept: INTERNAL MEDICINE CLINIC | Facility: CLINIC | Age: 65
End: 2025-01-31

## 2025-01-31 NOTE — TELEPHONE ENCOUNTER
Received fax from [facility] requesting diagnosis code and signature for this refill request below.   Forms placed in your bin.   FABIAN SOLORZANO

## 2025-02-05 ENCOUNTER — TELEPHONE (OUTPATIENT)
Dept: INTERNAL MEDICINE CLINIC | Facility: CLINIC | Age: 65
End: 2025-02-05

## 2025-03-13 RX ORDER — LEVOTHYROXINE SODIUM 175 UG/1
175 TABLET ORAL
Qty: 90 TABLET | Refills: 0 | Status: SHIPPED | OUTPATIENT
Start: 2025-03-13 | End: 2025-06-11

## 2025-03-17 RX ORDER — ATORVASTATIN CALCIUM 10 MG/1
10 TABLET, FILM COATED ORAL DAILY
Qty: 90 TABLET | Refills: 0 | OUTPATIENT
Start: 2025-03-17

## 2025-05-27 ENCOUNTER — OFFICE VISIT (OUTPATIENT)
Dept: INTERNAL MEDICINE CLINIC | Facility: CLINIC | Age: 65
End: 2025-05-27
Payer: MEDICARE

## 2025-05-27 VITALS
HEART RATE: 70 BPM | WEIGHT: 133.63 LBS | OXYGEN SATURATION: 98 % | HEIGHT: 67 IN | DIASTOLIC BLOOD PRESSURE: 80 MMHG | BODY MASS INDEX: 20.97 KG/M2 | SYSTOLIC BLOOD PRESSURE: 112 MMHG

## 2025-05-27 DIAGNOSIS — I25.10 CORONARY ARTERY CALCIFICATION: Primary | ICD-10-CM

## 2025-05-27 DIAGNOSIS — Z12.11 SCREENING FOR COLON CANCER: ICD-10-CM

## 2025-05-27 DIAGNOSIS — I10 ESSENTIAL HYPERTENSION: ICD-10-CM

## 2025-05-27 DIAGNOSIS — E03.9 HYPOTHYROIDISM, UNSPECIFIED TYPE: ICD-10-CM

## 2025-05-27 DIAGNOSIS — Z78.0 ASYMPTOMATIC MENOPAUSE: ICD-10-CM

## 2025-05-27 PROCEDURE — 99214 OFFICE O/P EST MOD 30 MIN: CPT | Performed by: INTERNAL MEDICINE

## 2025-05-27 NOTE — PROGRESS NOTES
May Mir is a 64 year old female.    Chief complaint: follow up on chronic conditions       HPI:     May Mir is a 64 year old female who presents for follow up on chronic conditions   Doesn't take baby aspirin         Asthma   Under control     Hypothyroidism   On levothyroxine   175 mcg       HTN   Stable         Didn't have the chance to schedule stress test and echo   No chest pain   No sob   She is feeling well           Current Medications[1]   Past Medical History[2]  Past Surgical History[3]        Family History[4]  Problem List[5]    REVIEW OF SYSTEMS:   A comprehensive 10 point review of systems was completed.  Pertinent positives and negatives noted in the the HPI            EXAM:   /80   Pulse 70   Ht 5' 7\" (1.702 m)   Wt 133 lb 9.6 oz (60.6 kg)   SpO2 98%   BMI 20.92 kg/m²   GENERAL: well developed, well nourished,in no apparent distress    LUNGS: clear to auscultation  CARDIO: RRR without murmur               No orders of the defined types were placed in this encounter.    No results found.         ASSESSMENT AND PLAN:   1. Coronary artery calcification  Advised to go for EKG , echo and stress test   She would like to hold off on stress test but she is open to it if EKG or echo is abnormal   Continue statin   Advised to start baby aspirin    - EKG 12 Lead to be performed at [facility]; Future  - CARD ECHO 2D DOPPLER (CPT=93306); Future  - XR DEXA BONE DENSITOMETRY (CPT=77080); Future  - Basic Metabolic Panel (8) [E]; Future  - TSH and Free T4 [E]; Future    2. Screening for colon cancer  Advised to have colonoscopy    Patient would like to hold off   She is agreeable to cologaurd   - EKG 12 Lead to be performed at [facility]; Future  - CARD ECHO 2D DOPPLER (CPT=93306); Future  - XR DEXA BONE DENSITOMETRY (CPT=77080); Future  - COLOGUARD COLON CANCER SCREENING (EXTERNAL)  - Basic Metabolic Panel (8) [E]; Future  - TSH and Free T4 [E];  Future    3. Asymptomatic menopause  Dexa scan   - EKG 12 Lead to be performed at [facility]; Future  - CARD ECHO 2D DOPPLER (CPT=93306); Future  - XR DEXA BONE DENSITOMETRY (CPT=77080); Future  - Basic Metabolic Panel (8) [E]; Future  - TSH and Free T4 [E]; Future    4. Hypothyroidism, unspecified type  Continue levothyroxine   Tsh level   - EKG 12 Lead to be performed at [facility]; Future  - CARD ECHO 2D DOPPLER (CPT=93306); Future  - XR DEXA BONE DENSITOMETRY (CPT=77080); Future  - Basic Metabolic Panel (8) [E]; Future  - TSH and Free T4 [E]; Future    5. Essential hypertension  BP at goal   Bmp   Continue current medications     - EKG 12 Lead to be performed at [facility]; Future  - CARD ECHO 2D DOPPLER (CPT=93306); Future  - XR DEXA BONE DENSITOMETRY (CPT=77080); Future  - Basic Metabolic Panel (8) [E]; Future  - TSH and Free T4 [E]; Future     Please return to the clinic if you are having persistent symptoms. If worsening symptoms should go to the ER    Justin Ivey MD,   Diplomate of the American Board of Internal Medicine  Diplomate of the American Board of Obesity Medicine          [1]   Current Outpatient Medications   Medication Sig Dispense Refill    Budesonide-Formoterol Fumarate 80-4.5 MCG/ACT Inhalation Aerosol Inhale 2 puffs into the lungs 2 (two) times daily. 3 each 3    LEVOTHYROXINE 175 MCG Oral Tab Take 1 tablet (175 mcg total) by mouth before breakfast. 90 tablet 0    ATORVASTATIN 10 MG Oral Tab TAKE ONE TABLET BY MOUTH DAILY 90 tablet 0    lisinopril 5 MG Oral Tab Take 1 tablet (5 mg total) by mouth daily. 30 tablet 0    MONTELUKAST 10 MG Oral Tab Take 1 tablet (10 mg total) by mouth every evening. 90 tablet 0    fluticasone propionate 50 MCG/ACT Nasal Suspension 2 sprays by Nasal route daily. 16 g 0    montelukast 10 MG Oral Tab Take 1 tablet (10 mg total) by mouth every evening. 90 tablet 3    Albuterol Sulfate  (90 Base) MCG/ACT  Inhalation Aero Soln Inhale 2 puffs into the lungs every 6 (six) hours as needed for Wheezing. 3 Inhaler 3    Cyanocobalamin (VITAMIN B 12) 100 MCG Oral Lozenge Take by mouth.      levothyroxine 200 MCG Oral Tab Take 1 tablet (200 mcg total) by mouth before breakfast. (Patient not taking: Reported on 5/27/2025) 90 tablet 0    levothyroxine 25 MCG Oral Tab Take 1 tablet (25 mcg total) by mouth before breakfast. (Patient not taking: Reported on 5/27/2025) 90 tablet 3    GABAPENTIN 300 MG Oral Cap TAKE 1 CAPSULE BY MOUTH AT NIGHT FOR 1 WEEK, 1 CAPSULE TWICE DAILY FOR 1 WEEK, THEN 1 CAPSULE 3X/DAILY AFTER THAT (Patient not taking: Reported on 5/27/2025) 90 capsule 0    Meloxicam 15 MG Oral Tab Take 1 tablet (15 mg total) by mouth daily as needed for Pain. (Patient not taking: Reported on 5/27/2025) 15 tablet 0    cyclobenzaprine 10 MG Oral Tab Take 10 mg by mouth. (Patient not taking: Reported on 5/27/2025)      naproxen 500 MG Oral Tab Take one tablet every 12 hours x 3 days then prn pain, inflammation. (Patient not taking: Reported on 5/27/2025)     [2]   Past Medical History:   Asthma (HCC)    Essential hypertension    Hypothyroidism   [3]   Past Surgical History:  Procedure Laterality Date    Other      removal of ovary   [4]   Family History  Problem Relation Age of Onset    Cancer Father         lung CA    Cancer Mother     Hypertension Sister    [5]   Patient Active Problem List  Diagnosis    Mild intermittent asthma without complication (HCC)    Essential hypertension    Osteoarthritis of cervical spine    Hypothyroidism    Mitral valve insufficiency    Pulmonary emphysema (HCC)    Lung nodules    History of smoking    Encounter for screening for malignant neoplasm of breast    Screening for osteoporosis    Atherosclerosis of coronary artery of native heart without angina pectoris    Screening for skin cancer    Elevated coronary artery calcium score    Screening for colon cancer    Annual physical exam     Family history of lung cancer    Pain of left hip    Coronary artery calcification    Asymptomatic menopausal state    Vitamin D deficiency

## 2025-06-12 LAB — AMB EXT COLOGUARD RESULT: POSITIVE

## 2025-06-16 RX ORDER — LEVOTHYROXINE SODIUM 175 UG/1
175 TABLET ORAL
Qty: 90 TABLET | Refills: 3 | Status: SHIPPED | OUTPATIENT
Start: 2025-06-16

## 2025-06-16 NOTE — TELEPHONE ENCOUNTER
Refill Per Protocol     Requested Prescriptions   Pending Prescriptions Disp Refills    LEVOTHYROXINE 175 MCG Oral Tab [Pharmacy Med Name: Levothyroxine Sodium 175 Mcg Tab Lupi] 90 tablet 0     Sig: Take 1 tablet (175 mcg total) by mouth before breakfast.       Thyroid Medication Protocol Passed - 6/16/2025  3:29 PM        Passed - TSH in past 12 months        Passed - Last TSH value is normal     Lab Results   Component Value Date    TSH 3.265 09/18/2024    TSHT4 0.03 (L) 05/02/2023                 Passed - In person appointment or virtual visit in the past 12 mos or appointment in next 3 mos     Recent Outpatient Visits              [facility]    1 year ago Welcome to Medicare preventive visit    [facility]    [facility]    2 years ago Annual physical exam    [facility]    [facility]    3 years ago Primary hypertension    [facility]    [facility]    3 years ago Annual physical exam    [facility]    [facility]          Future Appointments         Provider Department Appt Notes    In 4 months [facility]                    Passed - Medication is active on med list

## 2025-06-19 ENCOUNTER — TELEPHONE (OUTPATIENT)
Dept: INTERNAL MEDICINE CLINIC | Facility: CLINIC | Age: 65
End: 2025-06-19

## 2025-06-19 NOTE — TELEPHONE ENCOUNTER
Received fax from [employer] on Positive Cologuard results.     Entered positive results in HM.     Route message to provider.  FABIAN SOLORZANO

## 2025-06-28 DIAGNOSIS — Z12.11 SCREENING FOR COLON CANCER: ICD-10-CM

## 2025-06-28 DIAGNOSIS — Z00.00 ANNUAL PHYSICAL EXAM: ICD-10-CM

## 2025-07-01 ENCOUNTER — NURSE ONLY (OUTPATIENT)
Facility: CLINIC | Age: 65
End: 2025-07-01

## 2025-07-01 DIAGNOSIS — Z12.11 COLON CANCER SCREENING: Primary | ICD-10-CM

## 2025-07-01 DIAGNOSIS — R19.5 POSITIVE COLORECTAL CANCER SCREENING USING COLOGUARD TEST: ICD-10-CM

## 2025-07-01 RX ORDER — LISINOPRIL 5 MG/1
5 TABLET ORAL DAILY
Qty: 90 TABLET | Refills: 1 | Status: SHIPPED | OUTPATIENT
Start: 2025-07-01

## 2025-07-01 NOTE — TELEPHONE ENCOUNTER
Please review; protocol failed/No Protocol      Sent [employer] message to patient to complete labs from 05/27/2025

## 2025-07-01 NOTE — PROGRESS NOTES
Dr. Cornejo   Patient referred to GI by Dr. Ivey due to positive cologuard.  Please review and provide orders for scheduling if applicable.   Medications, pharmacy, and allergies reviewed.               › Age: 64 y.o.   › MD preference: Dr. Cornejo   › Insurance: Medicare   › Last pcp visit: 5/27/2025 - Referred by Dr. Ivey   › Last CBC: 5/29/2024 (WNL)   › Last FOBT/Cologuard: Yes - Positive 6/12/2025   › H/W: 5'7\" + 133lb   › BMI: 20.92     Special comments/notes: None   Telephone Colon Screening Questionnaire Yes No   Are you currently experiencing any GI symptoms [] [x]   If yes, explain     Rectal bleeding [] [x]   Black stool [] [x]   Dysphagia or food \"feeling stuck\" when eating [] [x]   Intractable vomiting [] [x]   Unexplained weight loss [] [x]   First colonoscopy [x] []   Family history of colon cancer [] [x]   Known issues or adverse effects with anesthesia [] [x]   If yes, explain     In the last 12 months, complaints of chest pain or shortness of breath  [] [x]   Referred to a cardiologist?  [] [x]   If yes, explain:      Respiratory:  oxygen/ANA/COPD  Asthma w/o complication  [x] []   CPAP/BiPAP  [] [x]   History of heart attack or stroke [] [x]   If yes, in the last 12 months?  [] [x]   History of devices (pacemaker/defibrillator/stent placement) [] [x]     Medication Reconciliation  Yes  No   Anticoagulants [] [x]   Diuretics  [] [x]   ACE Inhibitors/ARB's/Combo  Lisinopril  [x] []   Diabetic (oral/insulin)  [] [x]   Weight loss (phentermine/vyvanse/saxsenda/etc) [] [x]   Iron/vitamin E/herbal/multivitamin supplements [] []   NSAID/ASA  (ex: aspirin, Ibuprofen, naproxen, meloxicam, ketorolac, celecoxib, sulindac, indomethacin)  [] [x]   Marijuana/CBD/vaping use  Marijuana occasional usage  [x] []

## 2025-07-01 NOTE — PROGRESS NOTES
GI Scheduling Staff:     Please schedule: Colonoscopy with MAC  - okay for URGENT pool. Needs to get procedure in the next 4 weeks.    Please send SPLIT dose Golytely bowel prep.     Diagnosis: Screening, +COLOGUARD    Medication adjustments:  Day before procedure, hold: NONE  Day of procedure, hold: lisinopril     >>>Please inform patient if new medications are started after scheduling procedure they need to call clinic to notify us.

## 2025-07-02 NOTE — PROGRESS NOTES
Scheduled for:  Colonoscopy 17124  Provider Name:  Dr. Juarez  Date:  8/19/2025  Location:   [facility]  Sedation:  MAC  Time:  2:50 PM - Patient is aware EM will call the day before with arrival time.  Prep:  Golytely  Meds/Allergies Reconciled?:  Physician reviewed   Diagnosis with codes:  Colon cancer screening Z12.11; Positive Cologuard R19.5  Was patient informed to call insurance with codes (Y/N):  Yes, I confirmed MEDICARE insurance with the patient.   Referral sent?:  Referral was sent at the time of electronic surgical scheduling.   EM or [facility] notified?:  I sent an electronic request to Endo Scheduling and received a confirmation today.   Medication Orders: Hold blood pressure medications the night before and morning of procedure. Hold multivitamins/supplements for two weeks prior to procedure.  Misc Orders:  N/A     Further instructions given by staff:  I discussed the prep instructions with the patient which she verbally understood and is aware that I will send the instructions today.

## 2025-07-14 RX ORDER — MONTELUKAST SODIUM 10 MG/1
10 TABLET ORAL EVERY EVENING
Qty: 90 TABLET | Refills: 3 | Status: SHIPPED | OUTPATIENT
Start: 2025-07-14

## 2025-07-18 RX ORDER — MONTELUKAST SODIUM 10 MG/1
10 TABLET ORAL EVERY EVENING
Qty: 90 TABLET | Refills: 0 | OUTPATIENT
Start: 2025-07-18

## 2025-07-18 NOTE — TELEPHONE ENCOUNTER
Duplicate Refill request/refill too soon.     montelukast 10 MG - 1 year supply sent 7/14/25 to Brnena Garcia

## 2025-08-13 ENCOUNTER — TELEPHONE (OUTPATIENT)
Facility: CLINIC | Age: 65
End: 2025-08-13

## 2025-08-18 ENCOUNTER — ANESTHESIA EVENT (OUTPATIENT)
Dept: ENDOSCOPY | Facility: HOSPITAL | Age: 65
End: 2025-08-18

## 2025-08-18 ENCOUNTER — ANESTHESIA (OUTPATIENT)
Dept: ENDOSCOPY | Facility: HOSPITAL | Age: 65
End: 2025-08-18

## 2025-08-18 ENCOUNTER — HOSPITAL ENCOUNTER (OUTPATIENT)
Facility: HOSPITAL | Age: 65
Setting detail: HOSPITAL OUTPATIENT SURGERY
Discharge: HOME | End: 2025-08-18
Attending: INTERNAL MEDICINE | Admitting: INTERNAL MEDICINE

## 2025-08-18 VITALS
BODY MASS INDEX: 20.88 KG/M2 | RESPIRATION RATE: 14 BRPM | OXYGEN SATURATION: 100 % | DIASTOLIC BLOOD PRESSURE: 91 MMHG | SYSTOLIC BLOOD PRESSURE: 140 MMHG | WEIGHT: 133 LBS | HEIGHT: 67 IN | TEMPERATURE: 98 F | HEART RATE: 61 BPM

## 2025-08-18 DIAGNOSIS — Z12.11 COLON CANCER SCREENING: ICD-10-CM

## 2025-08-18 DIAGNOSIS — R19.5 POSITIVE COLORECTAL CANCER SCREENING USING COLOGUARD TEST: ICD-10-CM

## 2025-08-18 PROBLEM — K64.9 HEMORRHOIDS: Status: ACTIVE | Noted: 2025-08-18

## 2025-08-18 PROBLEM — K63.5 POLYP OF COLON: Status: ACTIVE | Noted: 2025-08-18

## 2025-08-18 PROCEDURE — 45381 COLONOSCOPY SUBMUCOUS NJX: CPT | Performed by: INTERNAL MEDICINE

## 2025-08-18 PROCEDURE — 45380 COLONOSCOPY AND BIOPSY: CPT | Performed by: INTERNAL MEDICINE

## 2025-08-18 PROCEDURE — 45385 COLONOSCOPY W/LESION REMOVAL: CPT | Performed by: INTERNAL MEDICINE

## 2025-08-18 RX ORDER — NALOXONE HYDROCHLORIDE 0.4 MG/ML
0.08 INJECTION, SOLUTION INTRAMUSCULAR; INTRAVENOUS; SUBCUTANEOUS ONCE AS NEEDED
Status: DISCONTINUED | OUTPATIENT
Start: 2025-08-18 | End: 2025-08-18

## 2025-08-18 RX ORDER — SODIUM CHLORIDE, SODIUM LACTATE, POTASSIUM CHLORIDE, CALCIUM CHLORIDE 600; 310; 30; 20 MG/100ML; MG/100ML; MG/100ML; MG/100ML
INJECTION, SOLUTION INTRAVENOUS CONTINUOUS
Status: DISCONTINUED | OUTPATIENT
Start: 2025-08-18 | End: 2025-08-18

## 2025-08-18 RX ORDER — LIDOCAINE HYDROCHLORIDE 10 MG/ML
INJECTION, SOLUTION EPIDURAL; INFILTRATION; INTRACAUDAL; PERINEURAL AS NEEDED
Status: DISCONTINUED | OUTPATIENT
Start: 2025-08-18 | End: 2025-08-18 | Stop reason: SURG

## 2025-08-18 RX ADMIN — LIDOCAINE HYDROCHLORIDE 50 MG: 10 INJECTION, SOLUTION EPIDURAL; INFILTRATION; INTRACAUDAL; PERINEURAL at 08:46:00

## 2025-08-18 RX ADMIN — SODIUM CHLORIDE, SODIUM LACTATE, POTASSIUM CHLORIDE, CALCIUM CHLORIDE: 600; 310; 30; 20 INJECTION, SOLUTION INTRAVENOUS at 08:45:00

## 2025-08-21 ENCOUNTER — TELEPHONE (OUTPATIENT)
Facility: CLINIC | Age: 65
End: 2025-08-21

## (undated) DIAGNOSIS — Z12.11 SCREENING FOR COLON CANCER: ICD-10-CM

## (undated) DIAGNOSIS — Z00.00 ANNUAL PHYSICAL EXAM: ICD-10-CM

## (undated) DEVICE — Device

## (undated) DEVICE — ELECTRODE ES RET 2 PATE W/ 10FT CRD MPLR DISP

## (undated) DEVICE — KIT CLEAN ENDOKIT 1.1OZ GOWNX2

## (undated) DEVICE — TUBING SCT CLR 6FT .25IN MDVC

## (undated) DEVICE — MARKER ENDOSCP TISS TATTOO C BLK SUSP PREFIL

## (undated) DEVICE — NEEDLE INJ 25GA CATH 230CM CHN 2.8MM ACUJECT

## (undated) DEVICE — KIT MFLD FOR SPEC COLL

## (undated) DEVICE — KIT ENDO ORCAPOD 160/180/190

## (undated) NOTE — MR AVS SNAPSHOT
[address] at [address] [id number]  [phone]               Thank you for choosing us for your health care visit with Fawad Quintero MD.  We are glad to serve you and happy to provide you with this Assoc Dx:  Screening for colon cancer [Z12.11], Vitamin B12 deficiency [E53.8]                 Referral Details     Referred By    Referred To    Michael Skaggs MD   [facility] [address] [place] [address]   Phone:  [phone]   Fax:  [phone]    Diagnoses: Current Medications          This list is accurate as of: 5/3/17 12:14 PM.  Always use your most recent med list.                Amoxicillin-Pot Clavulanate 875-125 MG Tabs   Take 1 tablet by mouth 2 (two) times daily.    Commonly known as:  AUGMENTIN visit,  view other health information, and more. To sign up or find more information, go to [email or web address] and click on the Sign Up Now link in the [employer] box.      Enter your Whistle Activation Code exactly as it appears below along with yo

## (undated) NOTE — MR AVS SNAPSHOT
[address] at [address] [phone]  [phone]               Thank you for choosing us for your health care visit with Kirill Julian MD.  We are glad to serve you and happy to provide you with this Screening for breast cancer    -  Primary    Mild intermittent asthma without complication          Instructions and Information about Your Health      Controlling High Blood Pressure  High blood pressure (hypertension) is often called the silent killer. · Be active at a moderate to vigorous level of physical activity for at least 40 minutes for a minimum of 3 to 4 days a week.   Manage stress  · Make time to relax and enjoy life. Find time to laugh.   · Communicate your concerns with your loved ones and yo Meclizine HCl 25 MG Tabs   TAKE ONE TABLET BY MOUTH EVERY 6 HOURS AS NEEDED FOR DIZZINESS   Commonly known as:  ANTIVERT           methylPREDNISolone 4 MG Tbpk   As directed.    Commonly known as:  MEDROL           Montelukast Sodium 10 MG Tabs   Take 1 ta Support Staff. Remember, MyChart is NOT to be used for urgent needs. For medical emergencies, dial 911.            Visit [facility] online at  [email or web address]

## (undated) NOTE — LETTER
10/25/18        [address] [place] [address]      Dear Kojo Ortiz records indicate that you have outstanding lab work and or testing that was ordered for you and has not yet been completed:  Orders Placed This Encounter

## (undated) NOTE — LETTER
ASTHMA ACTION PLAN for Vita Weems     : 1960     Date: 21  Doctor:  Olman Griffin MD  Phone for doctor or clinic: [address] [facility].  [address] [facility] [address] [phone] times daily. Albuterol Sulfate  (90 Base) MCG/ACT Inhalation Aero Soln    Inhale 2 puffs into the lungs every 6 (six) hours as needed for Wheezing. 3. Red - Stop!  Danger! <50% Personal Best Peak Flow  Continue Controller Medicat

## (undated) NOTE — LETTER
ASTHMA ACTION PLAN for Alice Alonzo     : 1960     Date: 19  Doctor:  Karen Bernal MD  Phone for doctor or clinic: [address] [facility].  [place] [phone] [phone] Inhale 2 puffs into the lungs every 6 (six) hours as needed for Wheezing. 3. Red - Stop!  Danger! <50% Personal Best Peak Flow  Continue Controller Medications But ADD:   Medicine not helping  Breathing is hard and fast  Nose opens wide  C

## (undated) NOTE — Clinical Note
ASTHMA ACTION PLAN for Kevin Leyva     : 1960     Date: 2017  Provider:  Conor Griffin MD  Phone for doctor or clinic: [address] [facility], [address] [place] [address].  [place] [phone]

## (undated) NOTE — LETTER
ASTHMA ACTION PLAN for Toña Fung     : 1960     Date: 2018  Provider:  Dian Field MD  Phone for doctor or clinic: [address] [facility].  [facility] [address] [phone]